# Patient Record
Sex: MALE | Race: WHITE | NOT HISPANIC OR LATINO | Employment: OTHER | URBAN - METROPOLITAN AREA
[De-identification: names, ages, dates, MRNs, and addresses within clinical notes are randomized per-mention and may not be internally consistent; named-entity substitution may affect disease eponyms.]

---

## 2020-03-12 ENCOUNTER — LAB VISIT (OUTPATIENT)
Dept: LAB | Facility: HOSPITAL | Age: 80
End: 2020-03-12
Attending: FAMILY MEDICINE
Payer: MEDICARE

## 2020-03-12 DIAGNOSIS — I48.91 A-FIB: ICD-10-CM

## 2020-03-12 DIAGNOSIS — D40.0 NEOPLASM OF UNCERTAIN BEHAVIOR OF PROSTATE: ICD-10-CM

## 2020-03-12 DIAGNOSIS — F02.80 ALZHEIMER DISEASE: Primary | ICD-10-CM

## 2020-03-12 DIAGNOSIS — I10 HBP (HIGH BLOOD PRESSURE): ICD-10-CM

## 2020-03-12 DIAGNOSIS — G30.9 ALZHEIMER DISEASE: Primary | ICD-10-CM

## 2020-03-12 LAB
ALBUMIN SERPL BCP-MCNC: 3.6 G/DL (ref 3.5–5.2)
ALP SERPL-CCNC: 75 U/L (ref 55–135)
ALT SERPL W/O P-5'-P-CCNC: 30 U/L (ref 10–44)
ANION GAP SERPL CALC-SCNC: 5 MMOL/L (ref 8–16)
AST SERPL-CCNC: 31 U/L (ref 10–40)
BILIRUB SERPL-MCNC: 1 MG/DL (ref 0.1–1)
BUN SERPL-MCNC: 15 MG/DL (ref 8–23)
CALCIUM SERPL-MCNC: 8.8 MG/DL (ref 8.7–10.5)
CHLORIDE SERPL-SCNC: 103 MMOL/L (ref 95–110)
CHOLEST SERPL-MCNC: 195 MG/DL (ref 120–199)
CHOLEST/HDLC SERPL: 3.8 {RATIO} (ref 2–5)
CO2 SERPL-SCNC: 29 MMOL/L (ref 23–29)
COMPLEXED PSA SERPL-MCNC: 0.91 NG/ML (ref 0–4)
CREAT SERPL-MCNC: 0.9 MG/DL (ref 0.5–1.4)
ERYTHROCYTE [DISTWIDTH] IN BLOOD BY AUTOMATED COUNT: 12.8 % (ref 11.5–14.5)
EST. GFR  (AFRICAN AMERICAN): >60 ML/MIN/1.73 M^2
EST. GFR  (NON AFRICAN AMERICAN): >60 ML/MIN/1.73 M^2
GLUCOSE SERPL-MCNC: 97 MG/DL (ref 70–110)
HCT VFR BLD AUTO: 44.7 % (ref 40–54)
HDLC SERPL-MCNC: 51 MG/DL (ref 40–75)
HDLC SERPL: 26.2 % (ref 20–50)
HGB BLD-MCNC: 15 G/DL (ref 14–18)
LDLC SERPL CALC-MCNC: 128.2 MG/DL (ref 63–159)
MCH RBC QN AUTO: 34.4 PG (ref 27–31)
MCHC RBC AUTO-ENTMCNC: 33.6 G/DL (ref 32–36)
MCV RBC AUTO: 103 FL (ref 82–98)
NONHDLC SERPL-MCNC: 144 MG/DL
PLATELET # BLD AUTO: 259 K/UL (ref 150–350)
PMV BLD AUTO: 11.4 FL (ref 9.2–12.9)
POTASSIUM SERPL-SCNC: 4.2 MMOL/L (ref 3.5–5.1)
PROT SERPL-MCNC: 6.5 G/DL (ref 6–8.4)
RBC # BLD AUTO: 4.36 M/UL (ref 4.6–6.2)
SODIUM SERPL-SCNC: 137 MMOL/L (ref 136–145)
TRIGL SERPL-MCNC: 79 MG/DL (ref 30–150)
TSH SERPL DL<=0.005 MIU/L-ACNC: 1.04 UIU/ML (ref 0.34–5.6)
WBC # BLD AUTO: 8.89 K/UL (ref 3.9–12.7)

## 2020-03-12 PROCEDURE — 36415 COLL VENOUS BLD VENIPUNCTURE: CPT

## 2020-03-12 PROCEDURE — 84443 ASSAY THYROID STIM HORMONE: CPT

## 2020-03-12 PROCEDURE — 85027 COMPLETE CBC AUTOMATED: CPT

## 2020-03-12 PROCEDURE — 84153 ASSAY OF PSA TOTAL: CPT

## 2020-03-12 PROCEDURE — 80061 LIPID PANEL: CPT

## 2020-03-12 PROCEDURE — 80053 COMPREHEN METABOLIC PANEL: CPT

## 2020-08-28 ENCOUNTER — HOSPITAL ENCOUNTER (OUTPATIENT)
Facility: HOSPITAL | Age: 80
Discharge: HOSPICE | End: 2020-08-31
Attending: EMERGENCY MEDICINE | Admitting: FAMILY MEDICINE
Payer: MEDICARE

## 2020-08-28 DIAGNOSIS — R05.9 COUGH: ICD-10-CM

## 2020-08-28 DIAGNOSIS — F02.80 LATE ONSET ALZHEIMER'S DISEASE WITHOUT BEHAVIORAL DISTURBANCE: ICD-10-CM

## 2020-08-28 DIAGNOSIS — G30.1 LATE ONSET ALZHEIMER'S DISEASE WITHOUT BEHAVIORAL DISTURBANCE: ICD-10-CM

## 2020-08-28 DIAGNOSIS — I48.91 ATRIAL FIBRILLATION, UNSPECIFIED TYPE: ICD-10-CM

## 2020-08-28 DIAGNOSIS — J18.9 COMMUNITY ACQUIRED PNEUMONIA OF RIGHT LUNG, UNSPECIFIED PART OF LUNG: Primary | ICD-10-CM

## 2020-08-28 DIAGNOSIS — R09.02 HYPOXIA: ICD-10-CM

## 2020-08-28 LAB
ALBUMIN SERPL BCP-MCNC: 3.7 G/DL (ref 3.5–5.2)
ALLENS TEST: ABNORMAL
ALP SERPL-CCNC: 85 U/L (ref 55–135)
ALT SERPL W/O P-5'-P-CCNC: 38 U/L (ref 10–44)
ANION GAP SERPL CALC-SCNC: 9 MMOL/L (ref 8–16)
AST SERPL-CCNC: 34 U/L (ref 10–40)
BASOPHILS NFR BLD: 0 % (ref 0–1.9)
BILIRUB SERPL-MCNC: 1 MG/DL (ref 0.1–1)
BILIRUB UR QL STRIP: NEGATIVE
BNP SERPL-MCNC: 121 PG/ML (ref 0–99)
BUN SERPL-MCNC: 14 MG/DL (ref 8–23)
CALCIUM SERPL-MCNC: 8.8 MG/DL (ref 8.7–10.5)
CHLORIDE SERPL-SCNC: 100 MMOL/L (ref 95–110)
CLARITY UR: CLEAR
CO2 SERPL-SCNC: 26 MMOL/L (ref 23–29)
COLOR UR: YELLOW
CREAT SERPL-MCNC: 0.9 MG/DL (ref 0.5–1.4)
D DIMER PPP IA.FEU-MCNC: <0.19 MG/L FEU
DELSYS: ABNORMAL
DIFFERENTIAL METHOD: ABNORMAL
EOSINOPHIL NFR BLD: 22 % (ref 0–8)
ERYTHROCYTE [DISTWIDTH] IN BLOOD BY AUTOMATED COUNT: 13.1 % (ref 11.5–14.5)
EST. GFR  (AFRICAN AMERICAN): >60 ML/MIN/1.73 M^2
EST. GFR  (NON AFRICAN AMERICAN): >60 ML/MIN/1.73 M^2
FIO2: 21
GLUCOSE SERPL-MCNC: 74 MG/DL (ref 70–110)
GLUCOSE UR QL STRIP: NEGATIVE
HCO3 UR-SCNC: 23.4 MMOL/L (ref 24–28)
HCT VFR BLD AUTO: 42.5 % (ref 40–54)
HGB BLD-MCNC: 14.5 G/DL (ref 14–18)
HGB UR QL STRIP: NEGATIVE
IMM GRANULOCYTES # BLD AUTO: ABNORMAL K/UL (ref 0–0.04)
IMM GRANULOCYTES NFR BLD AUTO: ABNORMAL % (ref 0–0.5)
KETONES UR QL STRIP: NEGATIVE
LACTATE SERPL-SCNC: 1.2 MMOL/L (ref 0.5–2.2)
LEUKOCYTE ESTERASE UR QL STRIP: NEGATIVE
LYMPHOCYTES NFR BLD: 57 % (ref 18–48)
MCH RBC QN AUTO: 33.5 PG (ref 27–31)
MCHC RBC AUTO-ENTMCNC: 34.1 G/DL (ref 32–36)
MCV RBC AUTO: 98 FL (ref 82–98)
MODE: ABNORMAL
MONOCYTES NFR BLD: 3 % (ref 4–15)
NEUTROPHILS NFR BLD: 18 % (ref 38–73)
NITRITE UR QL STRIP: NEGATIVE
NRBC BLD-RTO: 0 /100 WBC
PCO2 BLDA: 36.2 MMHG (ref 35–45)
PH SMN: 7.42 [PH] (ref 7.35–7.45)
PH UR STRIP: 7 [PH] (ref 5–8)
PLATELET # BLD AUTO: 198 K/UL (ref 150–350)
PLATELET BLD QL SMEAR: ABNORMAL
PMV BLD AUTO: 11 FL (ref 9.2–12.9)
PO2 BLDA: 62 MMHG (ref 80–100)
POC BE: -1 MMOL/L
POC SATURATED O2: 92 % (ref 95–100)
POC TCO2: 25 MMOL/L (ref 23–27)
POTASSIUM SERPL-SCNC: 4.3 MMOL/L (ref 3.5–5.1)
PROT SERPL-MCNC: 6.6 G/DL (ref 6–8.4)
PROT UR QL STRIP: NEGATIVE
RBC # BLD AUTO: 4.33 M/UL (ref 4.6–6.2)
SAMPLE: ABNORMAL
SARS-COV-2 RDRP RESP QL NAA+PROBE: NEGATIVE
SITE: ABNORMAL
SODIUM SERPL-SCNC: 135 MMOL/L (ref 136–145)
SP GR UR STRIP: 1.01 (ref 1–1.03)
TROPONIN I SERPL DL<=0.01 NG/ML-MCNC: 0.01 NG/ML (ref 0.02–0.5)
URN SPEC COLLECT METH UR: NORMAL
UROBILINOGEN UR STRIP-ACNC: NEGATIVE EU/DL
WBC # BLD AUTO: 16.79 K/UL (ref 3.9–12.7)

## 2020-08-28 PROCEDURE — 84484 ASSAY OF TROPONIN QUANT: CPT

## 2020-08-28 PROCEDURE — 99219 PR INITIAL OBSERVATION CARE,LEVL II: ICD-10-PCS | Mod: ,,, | Performed by: FAMILY MEDICINE

## 2020-08-28 PROCEDURE — 85007 BL SMEAR W/DIFF WBC COUNT: CPT

## 2020-08-28 PROCEDURE — 96365 THER/PROPH/DIAG IV INF INIT: CPT

## 2020-08-28 PROCEDURE — 25000003 PHARM REV CODE 250: Performed by: FAMILY MEDICINE

## 2020-08-28 PROCEDURE — 99291 CRITICAL CARE FIRST HOUR: CPT | Mod: 25

## 2020-08-28 PROCEDURE — 99219 PR INITIAL OBSERVATION CARE,LEVL II: CPT | Mod: ,,, | Performed by: FAMILY MEDICINE

## 2020-08-28 PROCEDURE — G0378 HOSPITAL OBSERVATION PER HR: HCPCS

## 2020-08-28 PROCEDURE — 82803 BLOOD GASES ANY COMBINATION: CPT

## 2020-08-28 PROCEDURE — 94760 N-INVAS EAR/PLS OXIMETRY 1: CPT

## 2020-08-28 PROCEDURE — 85379 FIBRIN DEGRADATION QUANT: CPT

## 2020-08-28 PROCEDURE — 83880 ASSAY OF NATRIURETIC PEPTIDE: CPT

## 2020-08-28 PROCEDURE — 80053 COMPREHEN METABOLIC PANEL: CPT

## 2020-08-28 PROCEDURE — 71045 X-RAY EXAM CHEST 1 VIEW: CPT | Mod: 26,,, | Performed by: RADIOLOGY

## 2020-08-28 PROCEDURE — 71045 XR CHEST AP PORTABLE: ICD-10-PCS | Mod: 26,,, | Performed by: RADIOLOGY

## 2020-08-28 PROCEDURE — 81003 URINALYSIS AUTO W/O SCOPE: CPT

## 2020-08-28 PROCEDURE — 36415 COLL VENOUS BLD VENIPUNCTURE: CPT

## 2020-08-28 PROCEDURE — 94761 N-INVAS EAR/PLS OXIMETRY MLT: CPT

## 2020-08-28 PROCEDURE — 99900035 HC TECH TIME PER 15 MIN (STAT)

## 2020-08-28 PROCEDURE — 63600175 PHARM REV CODE 636 W HCPCS: Performed by: NURSE PRACTITIONER

## 2020-08-28 PROCEDURE — 87040 BLOOD CULTURE FOR BACTERIA: CPT | Mod: 59

## 2020-08-28 PROCEDURE — 25000242 PHARM REV CODE 250 ALT 637 W/ HCPCS: Performed by: FAMILY MEDICINE

## 2020-08-28 PROCEDURE — 27000221 HC OXYGEN, UP TO 24 HOURS

## 2020-08-28 PROCEDURE — 93005 ELECTROCARDIOGRAM TRACING: CPT

## 2020-08-28 PROCEDURE — 94640 AIRWAY INHALATION TREATMENT: CPT

## 2020-08-28 PROCEDURE — U0002 COVID-19 LAB TEST NON-CDC: HCPCS

## 2020-08-28 PROCEDURE — 21400001 HC TELEMETRY ROOM

## 2020-08-28 PROCEDURE — 71045 X-RAY EXAM CHEST 1 VIEW: CPT | Mod: TC,FY

## 2020-08-28 PROCEDURE — 63700000 PHARM REV CODE 250 ALT 637 W/O HCPCS: Performed by: NURSE PRACTITIONER

## 2020-08-28 PROCEDURE — 83605 ASSAY OF LACTIC ACID: CPT

## 2020-08-28 PROCEDURE — 36600 WITHDRAWAL OF ARTERIAL BLOOD: CPT

## 2020-08-28 PROCEDURE — 85027 COMPLETE CBC AUTOMATED: CPT

## 2020-08-28 RX ORDER — METOPROLOL SUCCINATE 25 MG/1
25 TABLET, EXTENDED RELEASE ORAL DAILY
Status: DISCONTINUED | OUTPATIENT
Start: 2020-08-29 | End: 2020-08-31 | Stop reason: HOSPADM

## 2020-08-28 RX ORDER — METOPROLOL SUCCINATE 25 MG/1
25 TABLET, EXTENDED RELEASE ORAL DAILY
COMMUNITY

## 2020-08-28 RX ORDER — HYDROXYZINE HYDROCHLORIDE 50 MG/1
50 TABLET, FILM COATED ORAL 3 TIMES DAILY PRN
COMMUNITY

## 2020-08-28 RX ORDER — ROSUVASTATIN CALCIUM 10 MG/1
10 TABLET, COATED ORAL DAILY
COMMUNITY

## 2020-08-28 RX ORDER — IPRATROPIUM BROMIDE AND ALBUTEROL SULFATE 2.5; .5 MG/3ML; MG/3ML
SOLUTION RESPIRATORY (INHALATION)
Status: DISPENSED
Start: 2020-08-28 | End: 2020-08-29

## 2020-08-28 RX ORDER — ALPRAZOLAM 0.25 MG/1
0.25 TABLET ORAL 2 TIMES DAILY
Status: DISCONTINUED | OUTPATIENT
Start: 2020-08-28 | End: 2020-08-31 | Stop reason: HOSPADM

## 2020-08-28 RX ORDER — ATORVASTATIN CALCIUM 10 MG/1
10 TABLET, FILM COATED ORAL NIGHTLY
Status: DISCONTINUED | OUTPATIENT
Start: 2020-08-28 | End: 2020-08-31 | Stop reason: HOSPADM

## 2020-08-28 RX ORDER — ALPRAZOLAM 0.25 MG/1
TABLET ORAL 3 TIMES DAILY
COMMUNITY

## 2020-08-28 RX ORDER — AZITHROMYCIN 250 MG/1
500 TABLET, FILM COATED ORAL
Status: COMPLETED | OUTPATIENT
Start: 2020-08-28 | End: 2020-08-28

## 2020-08-28 RX ORDER — RIVAROXABAN 20 MG/1
20 TABLET, FILM COATED ORAL
COMMUNITY

## 2020-08-28 RX ORDER — SODIUM CHLORIDE 0.9 % (FLUSH) 0.9 %
10 SYRINGE (ML) INJECTION
Status: DISCONTINUED | OUTPATIENT
Start: 2020-08-28 | End: 2020-08-31 | Stop reason: HOSPADM

## 2020-08-28 RX ORDER — LORATADINE 10 MG/1
10 TABLET ORAL DAILY
COMMUNITY

## 2020-08-28 RX ORDER — MEMANTINE HYDROCHLORIDE 5 MG/1
5 TABLET ORAL 2 TIMES DAILY
Status: DISCONTINUED | OUTPATIENT
Start: 2020-08-28 | End: 2020-08-31 | Stop reason: HOSPADM

## 2020-08-28 RX ORDER — HYDROXYZINE HYDROCHLORIDE 25 MG/1
50 TABLET, FILM COATED ORAL 3 TIMES DAILY PRN
Status: DISCONTINUED | OUTPATIENT
Start: 2020-08-28 | End: 2020-08-31 | Stop reason: HOSPADM

## 2020-08-28 RX ORDER — IPRATROPIUM BROMIDE AND ALBUTEROL SULFATE 2.5; .5 MG/3ML; MG/3ML
3 SOLUTION RESPIRATORY (INHALATION) EVERY 4 HOURS PRN
Status: DISCONTINUED | OUTPATIENT
Start: 2020-08-28 | End: 2020-08-31 | Stop reason: HOSPADM

## 2020-08-28 RX ORDER — SODIUM CHLORIDE 9 MG/ML
INJECTION, SOLUTION INTRAVENOUS CONTINUOUS
Status: DISCONTINUED | OUTPATIENT
Start: 2020-08-28 | End: 2020-08-28

## 2020-08-28 RX ORDER — MEMANTINE HYDROCHLORIDE 10 MG/1
5 TABLET ORAL 2 TIMES DAILY
COMMUNITY

## 2020-08-28 RX ORDER — CETIRIZINE HYDROCHLORIDE 10 MG/1
10 TABLET ORAL DAILY
Status: DISCONTINUED | OUTPATIENT
Start: 2020-08-29 | End: 2020-08-31 | Stop reason: HOSPADM

## 2020-08-28 RX ADMIN — IPRATROPIUM BROMIDE AND ALBUTEROL SULFATE 3 ML: .5; 3 SOLUTION RESPIRATORY (INHALATION) at 11:08

## 2020-08-28 RX ADMIN — MEMANTINE HYDROCHLORIDE 5 MG: 5 TABLET ORAL at 08:08

## 2020-08-28 RX ADMIN — ALPRAZOLAM 0.25 MG: 0.25 TABLET ORAL at 08:08

## 2020-08-28 RX ADMIN — CEFTRIAXONE 1 G: 1 INJECTION, SOLUTION INTRAVENOUS at 04:08

## 2020-08-28 RX ADMIN — AZITHROMYCIN MONOHYDRATE 500 MG: 250 TABLET ORAL at 04:08

## 2020-08-28 RX ADMIN — ATORVASTATIN CALCIUM 10 MG: 10 TABLET, FILM COATED ORAL at 08:08

## 2020-08-28 RX ADMIN — IPRATROPIUM BROMIDE AND ALBUTEROL SULFATE 3 ML: .5; 3 SOLUTION RESPIRATORY (INHALATION) at 07:08

## 2020-08-28 RX ADMIN — RIVAROXABAN 20 MG: 10 TABLET, FILM COATED ORAL at 08:08

## 2020-08-28 NOTE — SUBJECTIVE & OBJECTIVE
Past Medical History:   Diagnosis Date    Alzheimer disease     Hyperlipemia        History reviewed. No pertinent surgical history.    Review of patient's allergies indicates:  No Known Allergies    No current facility-administered medications on file prior to encounter.      Current Outpatient Medications on File Prior to Encounter   Medication Sig    ALPRAZolam (XANAX) 0.25 MG tablet Take by mouth 3 (three) times daily.    hydrOXYzine (ATARAX) 50 MG tablet Take 50 mg by mouth 3 (three) times daily as needed for Itching.    loratadine (CLARITIN) 10 mg tablet Take 10 mg by mouth once daily.    memantine (NAMENDA) 10 MG Tab Take 5 mg by mouth 2 (two) times daily.    metoprolol succinate (TOPROL-XL) 25 MG 24 hr tablet Take 25 mg by mouth once daily.    rivaroxaban (XARELTO) 20 mg Tab Take 20 mg by mouth daily with dinner or evening meal.    rosuvastatin (CRESTOR) 10 MG tablet Take 10 mg by mouth once daily.     Family History     None        Tobacco Use    Smoking status: Never Smoker   Substance and Sexual Activity    Alcohol use: Never     Frequency: Never    Drug use: Not Currently    Sexual activity: Not Currently     Review of Systems   Unable to perform ROS: Dementia   Constitutional: Negative for fatigue and fever.   HENT: Positive for congestion.    Respiratory: Positive for cough, shortness of breath and wheezing.    Cardiovascular: Positive for leg swelling. Negative for chest pain and palpitations.   Gastrointestinal: Negative for abdominal pain.   Genitourinary: Negative.    Musculoskeletal: Negative for arthralgias.   Neurological:        Dementia   Psychiatric/Behavioral: Positive for confusion.        Worsening confusion     Objective:     Vital Signs (Most Recent):  Temp: 98.5 °F (36.9 °C) (08/28/20 1247)  Pulse: 68 (08/28/20 1632)  Resp: 18 (08/28/20 1632)  BP: (!) 155/80 (08/28/20 1632)  SpO2: 96 % (08/28/20 1632) Vital Signs (24h Range):  Temp:  [98.5 °F (36.9 °C)] 98.5 °F (36.9  °C)  Pulse:  [68-74] 68  Resp:  [18-21] 18  SpO2:  [93 %-96 %] 96 %  BP: (132-155)/(69-80) 155/80     Weight: 83.9 kg (185 lb)  Body mass index is 23.75 kg/m².    Physical Exam  Vitals signs reviewed.   Constitutional:       Appearance: Normal appearance. He is not ill-appearing or toxic-appearing.   HENT:      Head: Normocephalic and atraumatic.      Right Ear: External ear normal.      Left Ear: External ear normal.      Nose: Nose normal.      Mouth/Throat:      Mouth: Mucous membranes are dry.   Eyes:      Conjunctiva/sclera: Conjunctivae normal.   Cardiovascular:      Rate and Rhythm: Normal rate. Rhythm irregular.      Pulses: Normal pulses.      Heart sounds: No murmur. No gallop.    Pulmonary:      Effort: Pulmonary effort is normal.      Comments: No increased work of breathing or tachypnea, scattered end expiratory wheezes, bibasilar crackles  Abdominal:      General: Abdomen is flat. Bowel sounds are normal. There is no distension.      Tenderness: There is no abdominal tenderness.   Musculoskeletal:      Comments: Mild 1+ pitting edema b/l lower extremities   Skin:     General: Skin is warm and dry.   Neurological:      Mental Status: He is alert.      Comments: Pleasantly confused, worsening confusion than baseline             Significant Labs:   Recent Lab Results       08/28/20  1618   08/28/20  1542   08/28/20  1521   08/28/20  1406   08/28/20  1259        Albumin       3.7       Alkaline Phosphatase       85       Allens Test Pass             ALT       38       Anion Gap       9       Appearance, UA     Clear         AST       34       Basophil%       0.0       Bilirubin (UA)     Negative         BILIRUBIN TOTAL       1.0  Comment:  For infants and newborns, interpretation of results should be based  on gestational age, weight and in agreement with clinical  observations.  Premature Infant recommended reference ranges:  Up to 24 hours.............<8.0 mg/dL  Up to 48 hours............<12.0  mg/dL  3-5 days..................<15.0 mg/dL  6-29 days.................<15.0 mg/dL         BNP       121  Comment:  Values of less than 100 pg/ml are consistent with non-CHF populations.       Site LR             BUN, Bld       14       Calcium       8.8       Chloride       100       CO2       26       Color, UA     Yellow         Creatinine       0.9       D-Dimer   <0.19  Comment:  The quantitative D-dimer assay should be used as an aid in   the diagnosis of deep vein thrombosis and pulmonary embolism  in patients with the appropriate presentation and clinical  history. The upper limit of the reference interval and the clinical   cut off   point are identical. Causes of a positive (>0.50 mg/L FEU) D-Dimer   test  include, but are not limited to: DVT, PE, DIC, thrombolytic   therapy, anticoagulant therapy, recent surgery, trauma, or   pregnancy, disseminated malignancy, aortic aneurysm, cirrhosis,  and severe infection. False negative results may occur in   patients with distal DVT.             [facility] Room [facility]             Differential Method       Manual  Comment:  Corrected result; previously reported as Automated on 08/28/2020 at   15:20.  [C]       eGFR if        >60.0       eGFR if non        >60.0  Comment:  Calculation used to obtain the estimated glomerular filtration  rate (eGFR) is the CKD-EPI equation.          Eosinophil%       22.0       FiO2 21             Glucose       74       Glucose, UA     Negative         Gran%       18.0       Hematocrit       42.5       Hemoglobin       14.5       Immature Grans (Abs)       CANCELED  Comment:  Mild elevation in immature granulocytes is non specific and   can be seen in a variety of conditions including stress response,   acute inflammation, trauma and pregnancy. Correlation with other   laboratory and clinical findings is essential.    Result canceled by the ancillary.         Immature Granulocytes        CANCELED  Comment:  Result canceled by the ancillary.       Ketones, UA     Negative         Leukocytes, UA     Negative         Lymph%       57.0       MCH       33.5       MCHC       34.1       MCV       98       Mode SPONT             Mono%       3.0       MPV       11.0       NITRITE UA     Negative         nRBC       0       Occult Blood UA     Negative         pH, UA     7.0         Platelet Estimate       Appears normal       Platelets       198       POC BE -1             POC HCO3 23.4             POC PCO2 36.2             POC PH 7.419             POC PO2 62             POC SATURATED O2 92             POC TCO2 25             Potassium       4.3       PROTEIN TOTAL       6.6       Protein, UA     Negative  Comment:  Recommend a 24 hour urine protein or a urine   protein/creatinine ratio if globulin induced proteinuria is  clinically suspected.           RBC       4.33       RDW       13.1       Sample ARTERIAL             SARS-CoV-2 RNA, Amplification, Qual         Negative  Comment:  This test utilizes isothermal nucleic acid amplification   technology to detect the SARS-CoV-2 RdRp nucleic acid segment.   The analytical sensitivity (limit of detection) is 125 genome   equivalents/mL.   A POSITIVE result implies infection with the SARS-CoV-2 virus;  the patient is presumed to be contagious.    A NEGATIVE result means that SARS-CoV-2 nucleic acids are not  present above the limit of detection. A NEGATIVE result should be   treated as presumptive. It does not rule out the possibility of   COVID-19 and should not be the sole basis for treatment decisions.   If COVID-19 is strongly suspected based on clinical and exposure   history, re-testing using an alternate molecular assay should be   considered.   This test is only for use under the Food and Drug   Administration s Emergency Use Authorization (EUA).   Commercial kits are provided by [employer].   Performance characteristics of the EUA have been  independently  verified by [facility] Department of  Pathology and Laboratory Medicine.   _________________________________________________________________  The ID NOW COVID-19 Letter of Authorization, along with the   authorized Fact Sheet for Healthcare Providers, the authorized Fact  Sheet for Patients, and authorized labeling are available on the FDA   website:  www.fda.gov/MedicalDevices/Safety/EmergencySituations/hkj247331.htm       Sodium       135       Specific [place], UA     1.015         Specimen UA     Urine, Clean Catch         Troponin I       0.01       UROBILINOGEN UA     Negative         WBC       16.79                          All pertinent labs within the past 24 hours have been reviewed.    Significant Imaging: I have reviewed all pertinent imaging results/findings within the past 24 hours.

## 2020-08-28 NOTE — HPI
81 yo male with PMH of Alzheimer disease, A-fib on Xarelto, hyperlipidemia who presents to the ER with shortness of breath, cough. Patient was told to come to the ER for COVID testing but on arrival was found to be hypoxic with O2 sats around 90%. Blood work showed WBC 16,000. Wife reports that he has been sitting up on the side of the bed in the middle of the night trying to catch his breath. Has a worsening cough. Mild leg swelling. Wife and daughter report worsening confusion over the last few days. CXR essentially normal though given hypoxia on O2 sats and ABG with Pa02 62, hospital team called for admission for possible developing pneumonia.

## 2020-08-28 NOTE — ED PROVIDER NOTES
Encounter Date: 8/28/2020       History     Chief Complaint   Patient presents with    COVID-19 Concerns     80-year-old male with wife and daughter presents to ER by patient's PCP Dr. Cohn for COVID-19 screening; patient has been having mild shortness of breath & cough for several months, Dr. Cohn is declining to see patient until he is cleared from COVID-19, patient's shortness of breath worsens with activity, rest mildly improved his symptoms, symptoms have been waxing/waning since onset    Denies fever, headache, dizziness, syncope, vision changes, neck pain, painful/difficult swallowing, chest pain, abdominal pain, nausea/vomiting/diarrhea, hematuria/dysuria    Past medical/surgical history, allergies & current medications reviewed with patient -- Alzheimer's    Known SARS-CoV2 exposure:  No  Smokes:  No -- only as a youth      The history is provided by the patient. No  was used.     Review of patient's allergies indicates:  No Known Allergies  Past Medical History:   Diagnosis Date    Alzheimer disease     Hyperlipemia      History reviewed. No pertinent surgical history.  History reviewed. No pertinent family history.  Social History     Tobacco Use    Smoking status: Never Smoker   Substance Use Topics    Alcohol use: Never     Frequency: Never    Drug use: Not Currently     Review of Systems   Constitutional: Positive for fatigue. Negative for fever.   HENT: Negative for sore throat.    Eyes: Negative for visual disturbance.   Respiratory: Positive for cough and shortness of breath.    Cardiovascular: Negative for chest pain and leg swelling.   Gastrointestinal: Negative for abdominal pain and nausea.   Genitourinary: Negative for dysuria.   Musculoskeletal: Negative for back pain.   Skin: Negative for rash.   Neurological: Negative for weakness and headaches.   Hematological: Does not bruise/bleed easily.   All other systems reviewed and are negative.      Physical Exam      Initial Vitals [08/28/20 1247]   BP Pulse Resp Temp SpO2   132/69 71 18 98.5 °F (36.9 °C) (!) 93 %      MAP       --         Physical Exam    Nursing note and vitals reviewed.  Constitutional: He appears well-developed. He does not appear ill. No distress.   AF, SAT 93% RA   HENT:   Head: Normocephalic and atraumatic.   Right Ear: External ear normal.   Left Ear: External ear normal.   Nose: Nose normal.   Eyes: Lids are normal.   Neck: Neck supple.   Cardiovascular: Normal rate.   Pulses:       Dorsalis pedis pulses are 2+ on the right side and 2+ on the left side.        Posterior tibial pulses are 2+ on the right side and 2+ on the left side.   Pulmonary/Chest: Effort normal and breath sounds normal. No respiratory distress.   Abdominal: Soft. Bowel sounds are normal. He exhibits no distension. There is no abdominal tenderness.   Musculoskeletal:        Hands:       Right lower leg: He exhibits no swelling. No edema.      Left lower leg: He exhibits no swelling. No edema.   Neurological: He is alert.   Skin: Skin is warm. Capillary refill takes less than 2 seconds. No rash noted.   Psychiatric: He has a normal mood and affect.   Patient is pleasant and cooperative         ED Course   Critical Care    Date/Time: 8/28/2020 4:23 PM  Performed by: Jam Green NP  Authorized by: Nick Nagy MD   Total critical care time (exclusive of procedural time) : 30 minutes        Labs Reviewed   B-TYPE NATRIURETIC PEPTIDE - Abnormal; Notable for the following components:       Result Value     (*)     All other components within normal limits   CBC W/ AUTO DIFFERENTIAL - Abnormal; Notable for the following components:    WBC 16.79 (*)     RBC 4.33 (*)     Mean Corpuscular Hemoglobin 33.5 (*)     Gran% 18.0 (*)     Lymph% 57.0 (*)     Mono% 3.0 (*)     Eosinophil% 22.0 (*)     All other components within normal limits   COMPREHENSIVE METABOLIC PANEL - Abnormal; Notable for the following components:    Sodium  135 (*)     All other components within normal limits   TROPONIN I - Abnormal; Notable for the following components:    Troponin I 0.01 (*)     All other components within normal limits   ISTAT PROCEDURE - Abnormal; Notable for the following components:    POC PO2 62 (*)     POC HCO3 23.4 (*)     POC SATURATED O2 92 (*)     All other components within normal limits   SARS-COV-2 RNA AMPLIFICATION, QUAL   URINALYSIS, REFLEX TO URINE CULTURE    Narrative:     Specimen Source->Urine   D DIMER, QUANTITATIVE   D DIMER, QUANTITATIVE   LACTIC ACID, PLASMA     EKG Readings: (Independently Interpreted)   Initial Reading: No STEMI. Rhythm: Atrial Fibrillation. Heart Rate: 64. Ectopy: No Ectopy. Conduction: Normal. ST Segments: Normal ST Segments. Axis: Normal. Other Impression: Flattened T-wave V2, Abnormal EKG   Time: 1413  No previous EKG available       Imaging Results          X-Ray Chest AP Portable (Final result)  Result time 08/28/20 13:24:50    Final result by Sophy Bird MD (08/28/20 13:24:50)                 Impression:      Chronic perihilar peribronchial thickening which has increased since 2015.  Otherwise normal chest x-ray.      Electronically signed by: Sophy Bird  Date:    08/28/2020  Time:    13:24             Narrative:    EXAMINATION:  XR CHEST AP PORTABLE    CLINICAL HISTORY:  Cough, possible COVID-19    TECHNIQUE:  Single frontal view of the chest was performed.    COMPARISON:  8/18/15    FINDINGS:  The cardiomediastinal silhouette is within normal limits. There is calcific plaque in the aorta.  Perihilar peribronchial thickening is slightly more pronounced compared to the 2015 study.  The lungs are otherwise clear.  There is no pleural effusion or pneumothorax. No acute bony abnormality.                                 Medical Decision Making:   ED Management:  Chest x-ray image/report reviewed:  Chronic perihilar peribronchial thickening which has increased since 2015.  Otherwise normal  chest x-ray.    Lab results reviewed, significant findings:  COVID-19 negative, WBC 16, troponin negative, US is unimpressive -- BCX pending    ABG reviewed    Medications given:  Oxygen, azithromycin, Rocephin    Findings, diagnosis & plan of care discussed with patient/family:  Hypoxia, leukocytosis, CAP; we will admit patient to Hospitalist Service, patient/family agrees with this plan -- all questions answered, pleasant visit    Disclaimer:  This note was prepared with [employer] voice recognition transcription software. Garbled syntax, mangled pronouns, and other bizarre constructions may be attributed to that software system.  Should there be any questions do not hesitate to contact me for clarification.    Other:   I have discussed this case with another health care provider.       <> Summary of the Discussion: Dr. Nagy, Dr. Talbot                   ED Course as of Aug 29 1253   Fri Aug 28, 2020   1557 C/o AMS, nausea & lethargy sleeping all day    I have performed the rapid medical exam (RME) portion of the medical screening exam (MSE) & have determined that this patient requires further evaluation and/or testing to determine if an emergent medical condition exists     [DH]   1611 Spoke with Dr. Talbot who accepts PT for admission    [DH]      ED Course User Index  [DH] Jam Green NP                Clinical Impression:       ICD-10-CM ICD-9-CM   1. Community acquired pneumonia of right lung, unspecified part of lung  J18.9 486   2. Cough  R05 786.2   3. Hypoxia  R09.02 799.02             ED Disposition Condition    Observation                           Jam Green NP  08/28/20 1623       Jam Green NP  08/29/20 1254

## 2020-08-28 NOTE — ASSESSMENT & PLAN NOTE
Possibly pneumonia vs CHF with elevated BNP (slight) vs chronic interstitial lung disease  Worsening confusion per family, paroxsymal nocturnal dyspnea though no LYNNE  Afebrile, monitor fever curve  Leukocytosis on admission, monitor daily CBC  Start on IV Rocephin and Azithromycin  IVFs NS @ 75 mL/hr

## 2020-08-28 NOTE — H&P
[facility] Medicine  History & Physical    Patient Name: Benjamin Manrique  MRN: [id number]  Admission Date: 8/28/2020  Attending Physician: Chelsie Talbot MD   Primary Care Provider: Jacky Cohn MD         Patient information was obtained from patient, spouse/SO, relative(s) and ER records.     Subjective:     Principal Problem:Hypoxia    Chief Complaint:   Chief Complaint   Patient presents with    COVID-19 Concerns        HPI: 81 yo male with PMH of Alzheimer disease, A-fib on Xarelto, hyperlipidemia who presents to the ER with shortness of breath, cough. Patient was told to come to the ER for COVID testing but on arrival was found to be hypoxic with O2 sats around 90%. Blood work showed WBC 16,000. Wife reports that he has been sitting up on the side of the bed in the middle of the night trying to catch his breath. Has a worsening cough. Mild leg swelling. Wife and daughter report worsening confusion over the last few days. CXR essentially normal though given hypoxia on O2 sats and ABG with Pa02 62, hospital team called for admission for possible developing pneumonia.     Past Medical History:   Diagnosis Date    Alzheimer disease     Hyperlipemia        History reviewed. No pertinent surgical history.    Review of patient's allergies indicates:  No Known Allergies    No current facility-administered medications on file prior to encounter.      Current Outpatient Medications on File Prior to Encounter   Medication Sig    ALPRAZolam (XANAX) 0.25 MG tablet Take by mouth 3 (three) times daily.    hydrOXYzine (ATARAX) 50 MG tablet Take 50 mg by mouth 3 (three) times daily as needed for Itching.    loratadine (CLARITIN) 10 mg tablet Take 10 mg by mouth once daily.    memantine (NAMENDA) 10 MG Tab Take 5 mg by mouth 2 (two) times daily.    metoprolol succinate (TOPROL-XL) 25 MG 24 hr tablet Take 25 mg by mouth once daily.    rivaroxaban (XARELTO) 20 mg Tab Take  20 mg by mouth daily with dinner or evening meal.    rosuvastatin (CRESTOR) 10 MG tablet Take 10 mg by mouth once daily.     Family History     None        Tobacco Use    Smoking status: Never Smoker   Substance and Sexual Activity    Alcohol use: Never     Frequency: Never    Drug use: Not Currently    Sexual activity: Not Currently     Review of Systems   Unable to perform ROS: Dementia   Constitutional: Negative for fatigue and fever.   HENT: Positive for congestion.    Respiratory: Positive for cough, shortness of breath and wheezing.    Cardiovascular: Positive for leg swelling. Negative for chest pain and palpitations.   Gastrointestinal: Negative for abdominal pain.   Genitourinary: Negative.    Musculoskeletal: Negative for arthralgias.   Neurological:        Dementia   Psychiatric/Behavioral: Positive for confusion.        Worsening confusion     Objective:     Vital Signs (Most Recent):  Temp: 98.5 °F (36.9 °C) (08/28/20 1247)  Pulse: 68 (08/28/20 1632)  Resp: 18 (08/28/20 1632)  BP: (!) 155/80 (08/28/20 1632)  SpO2: 96 % (08/28/20 1632) Vital Signs (24h Range):  Temp:  [98.5 °F (36.9 °C)] 98.5 °F (36.9 °C)  Pulse:  [68-74] 68  Resp:  [18-21] 18  SpO2:  [93 %-96 %] 96 %  BP: (132-155)/(69-80) 155/80     Weight: 83.9 kg (185 lb)  Body mass index is 23.75 kg/m².    Physical Exam  Vitals signs reviewed.   Constitutional:       Appearance: Normal appearance. He is not ill-appearing or toxic-appearing.   HENT:      Head: Normocephalic and atraumatic.      Right Ear: External ear normal.      Left Ear: External ear normal.      Nose: Nose normal.      Mouth/Throat:      Mouth: Mucous membranes are dry.   Eyes:      Conjunctiva/sclera: Conjunctivae normal.   Cardiovascular:      Rate and Rhythm: Normal rate. Rhythm irregular.      Pulses: Normal pulses.      Heart sounds: No murmur. No gallop.    Pulmonary:      Effort: Pulmonary effort is normal.      Comments: No increased work of breathing or tachypnea,  scattered end expiratory wheezes, bibasilar crackles  Abdominal:      General: Abdomen is flat. Bowel sounds are normal. There is no distension.      Tenderness: There is no abdominal tenderness.   Musculoskeletal:      Comments: Mild 1+ pitting edema b/l lower extremities   Skin:     General: Skin is warm and dry.   Neurological:      Mental Status: He is alert.      Comments: Pleasantly confused, worsening confusion than baseline             Significant Labs:   Recent Lab Results       08/28/20  1618   08/28/20  1542   08/28/20  1521   08/28/20  1406   08/28/20  1259        Albumin       3.7       Alkaline Phosphatase       85       Allens Test Pass             ALT       38       Anion Gap       9       Appearance, UA     Clear         AST       34       Basophil%       0.0       Bilirubin (UA)     Negative         BILIRUBIN TOTAL       1.0  Comment:  For infants and newborns, interpretation of results should be based  on gestational age, weight and in agreement with clinical  observations.  Premature Infant recommended reference ranges:  Up to 24 hours.............<8.0 mg/dL  Up to 48 hours............<12.0 mg/dL  3-5 days..................<15.0 mg/dL  6-29 days.................<15.0 mg/dL         BNP       121  Comment:  Values of less than 100 pg/ml are consistent with non-CHF populations.       Site LR             BUN, Bld       14       Calcium       8.8       Chloride       100       CO2       26       Color, UA     Yellow         Creatinine       0.9       D-Dimer   <0.19  Comment:  The quantitative D-dimer assay should be used as an aid in   the diagnosis of deep vein thrombosis and pulmonary embolism  in patients with the appropriate presentation and clinical  history. The upper limit of the reference interval and the clinical   cut off   point are identical. Causes of a positive (>0.50 mg/L FEU) D-Dimer   test  include, but are not limited to: DVT, PE, DIC, thrombolytic   therapy, anticoagulant therapy,  recent surgery, trauma, or   pregnancy, disseminated malignancy, aortic aneurysm, cirrhosis,  and severe infection. False negative results may occur in   patients with distal DVT.             Urlist Room Air             Differential Method       Manual  Comment:  Corrected result; previously reported as Automated on 08/28/2020 at   15:20.  [C]       eGFR if        >60.0       eGFR if non        >60.0  Comment:  Calculation used to obtain the estimated glomerular filtration  rate (eGFR) is the CKD-EPI equation.          Eosinophil%       22.0       FiO2 21             Glucose       74       Glucose, UA     Negative         Gran%       18.0       Hematocrit       42.5       Hemoglobin       14.5       Immature Grans (Abs)       CANCELED  Comment:  Mild elevation in immature granulocytes is non specific and   can be seen in a variety of conditions including stress response,   acute inflammation, trauma and pregnancy. Correlation with other   laboratory and clinical findings is essential.    Result canceled by the ancillary.         Immature Granulocytes       CANCELED  Comment:  Result canceled by the ancillary.       Ketones, UA     Negative         Leukocytes, UA     Negative         Lymph%       57.0       MCH       33.5       MCHC       34.1       MCV       98       Mode SPONT             Mono%       3.0       MPV       11.0       NITRITE UA     Negative         nRBC       0       Occult Blood UA     Negative         pH, UA     7.0         Platelet Estimate       Appears normal       Platelets       198       POC BE -1             POC HCO3 23.4             POC PCO2 36.2             POC PH 7.419             POC PO2 62             POC SATURATED O2 92             POC TCO2 25             Potassium       4.3       PROTEIN TOTAL       6.6       Protein, UA     Negative  Comment:  Recommend a 24 hour urine protein or a urine   protein/creatinine ratio if globulin induced proteinuria  is  clinically suspected.           RBC       4.33       RDW       13.1       Sample ARTERIAL             SARS-CoV-2 RNA, Amplification, Qual         Negative  Comment:  This test utilizes isothermal nucleic acid amplification   technology to detect the SARS-CoV-2 RdRp nucleic acid segment.   The analytical sensitivity (limit of detection) is 125 genome   equivalents/mL.   A POSITIVE result implies infection with the SARS-CoV-2 virus;  the patient is presumed to be contagious.    A NEGATIVE result means that SARS-CoV-2 nucleic acids are not  present above the limit of detection. A NEGATIVE result should be   treated as presumptive. It does not rule out the possibility of   COVID-19 and should not be the sole basis for treatment decisions.   If COVID-19 is strongly suspected based on clinical and exposure   history, re-testing using an alternate molecular assay should be   considered.   This test is only for use under the Food and Drug   Administration s Emergency Use Authorization (EUA).   Commercial kits are provided by [employer].   Performance characteristics of the EUA have been independently  verified by [facility] Department of  Pathology and Laboratory Medicine.   _________________________________________________________________  The ID NOW COVID-19 Letter of Authorization, along with the   authorized Fact Sheet for Healthcare Providers, the authorized Fact  Sheet for Patients, and authorized labeling are available on the FDA   website:  www.fda.gov/MedicalDevices/Safety/EmergencySituations/xyt260176.htm       Sodium       135       Specific [place], UA     1.015         Specimen UA     Urine, Clean Catch         Troponin I       0.01       UROBILINOGEN UA     Negative         WBC       16.79                          All pertinent labs within the past 24 hours have been reviewed.    Significant Imaging: I have reviewed all pertinent imaging results/findings within the past 24  hours.    Assessment/Plan:     * Hypoxia  Possibly pneumonia vs CHF with elevated BNP (slight) vs chronic interstitial lung disease  Worsening confusion per family, paroxsymal nocturnal dyspnea though no LYNNE  Afebrile, monitor fever curve  Leukocytosis on admission, monitor daily CBC  Start on IV Rocephin and Azithromycin  IVFs NS @ 75 mL/hr        Atrial fibrillation  Rate controlled  Continue home Xarelto and metoprolol  Monitor on telemetry      Late onset Alzheimer's disease without behavioral disturbance  Continue home medications  Fall precautions  One to one sitter        VTE Risk Mitigation (From admission, onward)         Ordered     IP VTE HIGH RISK PATIENT  Once      08/28/20 1756     Place sequential compression device  Until discontinued      08/28/20 1756                   Chelsie Talbot MD  Department of Hospital Medicine   [facility]

## 2020-08-29 LAB
ANION GAP SERPL CALC-SCNC: 10 MMOL/L (ref 8–16)
BASOPHILS NFR BLD: 0 % (ref 0–1.9)
BUN SERPL-MCNC: 13 MG/DL (ref 8–23)
CALCIUM SERPL-MCNC: 8.6 MG/DL (ref 8.7–10.5)
CHLORIDE SERPL-SCNC: 100 MMOL/L (ref 95–110)
CO2 SERPL-SCNC: 24 MMOL/L (ref 23–29)
CREAT SERPL-MCNC: 0.9 MG/DL (ref 0.5–1.4)
DIFFERENTIAL METHOD: ABNORMAL
EOSINOPHIL NFR BLD: 18 % (ref 0–8)
ERYTHROCYTE [DISTWIDTH] IN BLOOD BY AUTOMATED COUNT: 13 % (ref 11.5–14.5)
EST. GFR  (AFRICAN AMERICAN): >60 ML/MIN/1.73 M^2
EST. GFR  (NON AFRICAN AMERICAN): >60 ML/MIN/1.73 M^2
GLUCOSE SERPL-MCNC: 94 MG/DL (ref 70–110)
HCT VFR BLD AUTO: 42.4 % (ref 40–54)
HGB BLD-MCNC: 14.6 G/DL (ref 14–18)
IMM GRANULOCYTES # BLD AUTO: ABNORMAL K/UL (ref 0–0.04)
IMM GRANULOCYTES NFR BLD AUTO: ABNORMAL % (ref 0–0.5)
LYMPHOCYTES NFR BLD: 46 % (ref 18–48)
MCH RBC QN AUTO: 33.8 PG (ref 27–31)
MCHC RBC AUTO-ENTMCNC: 34.4 G/DL (ref 32–36)
MCV RBC AUTO: 98 FL (ref 82–98)
MONOCYTES NFR BLD: 6 % (ref 4–15)
MYCOPLASMA AB SER QL: NORMAL
NEUTROPHILS NFR BLD: 30 % (ref 38–73)
NRBC BLD-RTO: 0 /100 WBC
PLATELET # BLD AUTO: 181 K/UL (ref 150–350)
PLATELET BLD QL SMEAR: ABNORMAL
PMV BLD AUTO: 11 FL (ref 9.2–12.9)
POTASSIUM SERPL-SCNC: 3.7 MMOL/L (ref 3.5–5.1)
RBC # BLD AUTO: 4.32 M/UL (ref 4.6–6.2)
SODIUM SERPL-SCNC: 134 MMOL/L (ref 136–145)
WBC # BLD AUTO: 17.8 K/UL (ref 3.9–12.7)

## 2020-08-29 PROCEDURE — 36415 COLL VENOUS BLD VENIPUNCTURE: CPT

## 2020-08-29 PROCEDURE — 25000242 PHARM REV CODE 250 ALT 637 W/ HCPCS: Performed by: FAMILY MEDICINE

## 2020-08-29 PROCEDURE — 96375 TX/PRO/DX INJ NEW DRUG ADDON: CPT

## 2020-08-29 PROCEDURE — 94640 AIRWAY INHALATION TREATMENT: CPT

## 2020-08-29 PROCEDURE — 99225 PR SUBSEQUENT OBSERVATION CARE,LEVEL II: CPT | Mod: ,,, | Performed by: FAMILY MEDICINE

## 2020-08-29 PROCEDURE — 27000221 HC OXYGEN, UP TO 24 HOURS

## 2020-08-29 PROCEDURE — 85027 COMPLETE CBC AUTOMATED: CPT

## 2020-08-29 PROCEDURE — 99225 PR SUBSEQUENT OBSERVATION CARE,LEVEL II: ICD-10-PCS | Mod: ,,, | Performed by: FAMILY MEDICINE

## 2020-08-29 PROCEDURE — 21400001 HC TELEMETRY ROOM

## 2020-08-29 PROCEDURE — 80048 BASIC METABOLIC PNL TOTAL CA: CPT

## 2020-08-29 PROCEDURE — 94761 N-INVAS EAR/PLS OXIMETRY MLT: CPT

## 2020-08-29 PROCEDURE — 25000003 PHARM REV CODE 250: Performed by: HOSPITALIST

## 2020-08-29 PROCEDURE — 63600175 PHARM REV CODE 636 W HCPCS: Performed by: FAMILY MEDICINE

## 2020-08-29 PROCEDURE — G0378 HOSPITAL OBSERVATION PER HR: HCPCS

## 2020-08-29 PROCEDURE — 25000003 PHARM REV CODE 250: Performed by: FAMILY MEDICINE

## 2020-08-29 PROCEDURE — 96376 TX/PRO/DX INJ SAME DRUG ADON: CPT

## 2020-08-29 PROCEDURE — 85007 BL SMEAR W/DIFF WBC COUNT: CPT | Mod: NCS

## 2020-08-29 PROCEDURE — 86738 MYCOPLASMA ANTIBODY: CPT

## 2020-08-29 RX ORDER — GUAIFENESIN/DEXTROMETHORPHAN 100-10MG/5
10 SYRUP ORAL EVERY 4 HOURS PRN
Status: DISCONTINUED | OUTPATIENT
Start: 2020-08-29 | End: 2020-08-31 | Stop reason: HOSPADM

## 2020-08-29 RX ADMIN — GUAIFENESIN AND DEXTROMETHORPHAN 10 ML: 100; 10 SYRUP ORAL at 11:08

## 2020-08-29 RX ADMIN — ALPRAZOLAM 0.25 MG: 0.25 TABLET ORAL at 08:08

## 2020-08-29 RX ADMIN — IPRATROPIUM BROMIDE AND ALBUTEROL SULFATE 3 ML: .5; 3 SOLUTION RESPIRATORY (INHALATION) at 11:08

## 2020-08-29 RX ADMIN — MEMANTINE HYDROCHLORIDE 5 MG: 5 TABLET ORAL at 08:08

## 2020-08-29 RX ADMIN — ATORVASTATIN CALCIUM 10 MG: 10 TABLET, FILM COATED ORAL at 08:08

## 2020-08-29 RX ADMIN — RIVAROXABAN 20 MG: 10 TABLET, FILM COATED ORAL at 04:08

## 2020-08-29 RX ADMIN — AZITHROMYCIN MONOHYDRATE 250 MG: 500 INJECTION, POWDER, LYOPHILIZED, FOR SOLUTION INTRAVENOUS at 03:08

## 2020-08-29 RX ADMIN — IPRATROPIUM BROMIDE AND ALBUTEROL SULFATE 3 ML: .5; 3 SOLUTION RESPIRATORY (INHALATION) at 02:08

## 2020-08-29 RX ADMIN — CEFTRIAXONE 1 G: 1 INJECTION, SOLUTION INTRAVENOUS at 04:08

## 2020-08-29 RX ADMIN — METOPROLOL SUCCINATE 25 MG: 25 TABLET, EXTENDED RELEASE ORAL at 08:08

## 2020-08-29 RX ADMIN — CETIRIZINE HYDROCHLORIDE 10 MG: 10 TABLET, FILM COATED ORAL at 08:08

## 2020-08-29 NOTE — PLAN OF CARE
Problem: Adult Inpatient Plan of Care  Goal: Plan of Care Review  Outcome: Ongoing, Progressing  Goal: Patient-Specific Goal (Individualization)  Outcome: Ongoing, Progressing  Goal: Absence of Hospital-Acquired Illness or Injury  Outcome: Ongoing, Progressing  Goal: Optimal Comfort and Wellbeing  Outcome: Ongoing, Progressing  Goal: Readiness for Transition of Care  Outcome: Ongoing, Progressing  Goal: Rounds/Family Conference  Outcome: Ongoing, Progressing     Problem: Fluid Imbalance (Pneumonia)  Goal: Fluid Balance  Outcome: Ongoing, Progressing     Problem: Infection (Pneumonia)  Goal: Resolution of Infection Signs/Symptoms  Outcome: Ongoing, Progressing     Problem: Respiratory Compromise (Pneumonia)  Goal: Effective Oxygenation and Ventilation  Outcome: Ongoing, Progressing

## 2020-08-30 LAB
ANION GAP SERPL CALC-SCNC: 9 MMOL/L (ref 8–16)
BASOPHILS NFR BLD: 0 % (ref 0–1.9)
BUN SERPL-MCNC: 12 MG/DL (ref 8–23)
CALCIUM SERPL-MCNC: 8.7 MG/DL (ref 8.7–10.5)
CHLORIDE SERPL-SCNC: 101 MMOL/L (ref 95–110)
CO2 SERPL-SCNC: 27 MMOL/L (ref 23–29)
CREAT SERPL-MCNC: 0.9 MG/DL (ref 0.5–1.4)
DIFFERENTIAL METHOD: ABNORMAL
EOSINOPHIL NFR BLD: 20 % (ref 0–8)
ERYTHROCYTE [DISTWIDTH] IN BLOOD BY AUTOMATED COUNT: 13.1 % (ref 11.5–14.5)
EST. GFR  (AFRICAN AMERICAN): >60 ML/MIN/1.73 M^2
EST. GFR  (NON AFRICAN AMERICAN): >60 ML/MIN/1.73 M^2
GLUCOSE SERPL-MCNC: 103 MG/DL (ref 70–110)
HCT VFR BLD AUTO: 41.8 % (ref 40–54)
HGB BLD-MCNC: 14.1 G/DL (ref 14–18)
IMM GRANULOCYTES # BLD AUTO: ABNORMAL K/UL (ref 0–0.04)
IMM GRANULOCYTES NFR BLD AUTO: ABNORMAL % (ref 0–0.5)
LYMPHOCYTES NFR BLD: 48 % (ref 18–48)
MCH RBC QN AUTO: 33.5 PG (ref 27–31)
MCHC RBC AUTO-ENTMCNC: 33.7 G/DL (ref 32–36)
MCV RBC AUTO: 99 FL (ref 82–98)
MONOCYTES NFR BLD: 5 % (ref 4–15)
NEUTROPHILS NFR BLD: 27 % (ref 38–73)
NRBC BLD-RTO: 0 /100 WBC
PLATELET # BLD AUTO: 181 K/UL (ref 150–350)
PMV BLD AUTO: 10.9 FL (ref 9.2–12.9)
POTASSIUM SERPL-SCNC: 3.9 MMOL/L (ref 3.5–5.1)
RBC # BLD AUTO: 4.21 M/UL (ref 4.6–6.2)
SODIUM SERPL-SCNC: 137 MMOL/L (ref 136–145)
WBC # BLD AUTO: 16.51 K/UL (ref 3.9–12.7)

## 2020-08-30 PROCEDURE — 85007 BL SMEAR W/DIFF WBC COUNT: CPT

## 2020-08-30 PROCEDURE — 94640 AIRWAY INHALATION TREATMENT: CPT

## 2020-08-30 PROCEDURE — 99225 PR SUBSEQUENT OBSERVATION CARE,LEVEL II: ICD-10-PCS | Mod: ,,, | Performed by: FAMILY MEDICINE

## 2020-08-30 PROCEDURE — 63600175 PHARM REV CODE 636 W HCPCS: Performed by: FAMILY MEDICINE

## 2020-08-30 PROCEDURE — 27000221 HC OXYGEN, UP TO 24 HOURS

## 2020-08-30 PROCEDURE — 25000242 PHARM REV CODE 250 ALT 637 W/ HCPCS: Performed by: FAMILY MEDICINE

## 2020-08-30 PROCEDURE — 94761 N-INVAS EAR/PLS OXIMETRY MLT: CPT

## 2020-08-30 PROCEDURE — 99225 PR SUBSEQUENT OBSERVATION CARE,LEVEL II: CPT | Mod: ,,, | Performed by: FAMILY MEDICINE

## 2020-08-30 PROCEDURE — 36415 COLL VENOUS BLD VENIPUNCTURE: CPT

## 2020-08-30 PROCEDURE — 80048 BASIC METABOLIC PNL TOTAL CA: CPT

## 2020-08-30 PROCEDURE — 21400001 HC TELEMETRY ROOM

## 2020-08-30 PROCEDURE — G0378 HOSPITAL OBSERVATION PER HR: HCPCS

## 2020-08-30 PROCEDURE — 85027 COMPLETE CBC AUTOMATED: CPT

## 2020-08-30 PROCEDURE — 96376 TX/PRO/DX INJ SAME DRUG ADON: CPT

## 2020-08-30 PROCEDURE — 25000003 PHARM REV CODE 250: Performed by: FAMILY MEDICINE

## 2020-08-30 PROCEDURE — 25000003 PHARM REV CODE 250: Performed by: HOSPITALIST

## 2020-08-30 RX ORDER — METHYLPREDNISOLONE SOD SUCC 125 MG
80 VIAL (EA) INJECTION EVERY 12 HOURS
Status: DISCONTINUED | OUTPATIENT
Start: 2020-08-30 | End: 2020-08-31 | Stop reason: HOSPADM

## 2020-08-30 RX ORDER — IPRATROPIUM BROMIDE AND ALBUTEROL SULFATE 2.5; .5 MG/3ML; MG/3ML
SOLUTION RESPIRATORY (INHALATION)
Status: DISPENSED
Start: 2020-08-30 | End: 2020-08-31

## 2020-08-30 RX ORDER — METHYLPREDNISOLONE SOD SUCC 125 MG
80 VIAL (EA) INJECTION EVERY 6 HOURS
Status: DISCONTINUED | OUTPATIENT
Start: 2020-08-30 | End: 2020-08-30

## 2020-08-30 RX ADMIN — ATORVASTATIN CALCIUM 10 MG: 10 TABLET, FILM COATED ORAL at 09:08

## 2020-08-30 RX ADMIN — METHYLPREDNISOLONE SODIUM SUCCINATE 80 MG: 125 INJECTION, POWDER, FOR SOLUTION INTRAMUSCULAR; INTRAVENOUS at 03:08

## 2020-08-30 RX ADMIN — AZITHROMYCIN MONOHYDRATE 250 MG: 500 INJECTION, POWDER, LYOPHILIZED, FOR SOLUTION INTRAVENOUS at 03:08

## 2020-08-30 RX ADMIN — CEFTRIAXONE 1 G: 1 INJECTION, SOLUTION INTRAVENOUS at 05:08

## 2020-08-30 RX ADMIN — ALPRAZOLAM 0.25 MG: 0.25 TABLET ORAL at 09:08

## 2020-08-30 RX ADMIN — IPRATROPIUM BROMIDE AND ALBUTEROL SULFATE 3 ML: .5; 3 SOLUTION RESPIRATORY (INHALATION) at 03:08

## 2020-08-30 RX ADMIN — MEMANTINE HYDROCHLORIDE 5 MG: 5 TABLET ORAL at 08:08

## 2020-08-30 RX ADMIN — ALPRAZOLAM 0.25 MG: 0.25 TABLET ORAL at 08:08

## 2020-08-30 RX ADMIN — IPRATROPIUM BROMIDE AND ALBUTEROL SULFATE 3 ML: .5; 3 SOLUTION RESPIRATORY (INHALATION) at 09:08

## 2020-08-30 RX ADMIN — RIVAROXABAN 20 MG: 10 TABLET, FILM COATED ORAL at 05:08

## 2020-08-30 RX ADMIN — GUAIFENESIN AND DEXTROMETHORPHAN 10 ML: 100; 10 SYRUP ORAL at 01:08

## 2020-08-30 RX ADMIN — CETIRIZINE HYDROCHLORIDE 10 MG: 10 TABLET, FILM COATED ORAL at 08:08

## 2020-08-30 RX ADMIN — MEMANTINE HYDROCHLORIDE 5 MG: 5 TABLET ORAL at 09:08

## 2020-08-30 RX ADMIN — METOPROLOL SUCCINATE 25 MG: 25 TABLET, EXTENDED RELEASE ORAL at 08:08

## 2020-08-30 NOTE — PROGRESS NOTES
[facility] Medicine  Progress Note    Patient Name: Benjamin Manrique  MRN: [id number]  Patient Class: OP- Observation   Admission Date: 8/28/2020  Length of Stay: 0 days  Attending Physician: Chelsie Talbot MD  Primary Care Provider: Jacky Cohn MD        Subjective:     Principal Problem:Hypoxia        HPI:  81 yo male with PMH of Alzheimer disease, A-fib on Xarelto, hyperlipidemia who presents to the ER with shortness of breath, cough. Patient was told to come to the ER for COVID testing but on arrival was found to be hypoxic with O2 sats around 90%. Blood work showed WBC 16,000. Wife reports that he has been sitting up on the side of the bed in the middle of the night trying to catch his breath. Has a worsening cough. Mild leg swelling. Wife and daughter report worsening confusion over the last few days. CXR essentially normal though given hypoxia on O2 sats and ABG with Pa02 62, hospital team called for admission for possible developing pneumonia.     Overview/Hospital Course:  08/29/2020  Patient admitted started on IV azithromycin.  Given DuoNeb treatments for wheezing.  Still with hypoxia requiring oxygen.  Attempting to wean.  Will need another day of IV antibiotics.  If no change, will change antibiotics.    8/30/2020  Patient continued on IV antibiotics.  Continued on scheduled DuoNebs.  Still requiring oxygen.  Continue to wean as tolerated.  Had a long discussion with patient's wife and 2 adult children regarding further treatment in care of patient after discharge.  Will consult case management in a.m. for home hospice.    Interval History:  No acute events    Review of Systems   Unable to perform ROS: Dementia     Objective:     Vital Signs (Most Recent):  Temp: 97 °F (36.1 °C) (08/30/20 1451)  Pulse: 75 (08/30/20 1600)  Resp: 18 (08/30/20 1451)  BP: 120/68 (08/30/20 1451)  SpO2: 100 % (08/30/20 1600) Vital Signs (24h Range):  Temp:  [96.7 °F (35.9  °C)-97.6 °F (36.4 °C)] 97 °F (36.1 °C)  Pulse:  [74-83] 75  Resp:  [16-18] 18  SpO2:  [95 %-100 %] 100 %  BP: (114-144)/(61-74) 120/68     Weight: 81.7 kg (180 lb 1.9 oz)  Body mass index is 23.13 kg/m².    Intake/Output Summary (Last 24 hours) at 8/30/2020 1847  Last data filed at 8/30/2020 1517  Gross per 24 hour   Intake 250 ml   Output --   Net 250 ml      Physical Exam  Vitals signs reviewed.   Constitutional:       Appearance: Normal appearance. He is not ill-appearing or toxic-appearing.   HENT:      Head: Normocephalic and atraumatic.      Right Ear: External ear normal.      Left Ear: External ear normal.      Nose: Nose normal.      Mouth/Throat:      Mouth: Mucous membranes are moist.   Eyes:      Conjunctiva/sclera: Conjunctivae normal.   Cardiovascular:      Rate and Rhythm: Normal rate and regular rhythm.      Pulses: Normal pulses.      Heart sounds: No murmur. No gallop.    Pulmonary:      Effort: Pulmonary effort is normal.      Comments: Moderate to poor air movement, biphasic wheezing, no rhonchi or crackles, chest tightness  Abdominal:      General: Abdomen is flat. Bowel sounds are normal. There is no distension.      Tenderness: There is no abdominal tenderness.   Musculoskeletal:      Right lower leg: No edema.      Left lower leg: No edema.   Skin:     General: Skin is warm and dry.   Neurological:      Mental Status: He is alert. Mental status is at baseline. He is disoriented.   Psychiatric:         Mood and Affect: Mood normal.         Behavior: Behavior normal.         Significant Labs:   Recent Lab Results       08/30/20  0714        Anion Gap 9     Basophil% 0.0  Comment:  Corrected result; previously reported as 0.7 on 08/30/2020 at 08:55.[C]     BUN, Bld 12     Calcium 8.7     Chloride 101     CO2 27     Creatinine 0.9     Differential Method Manual  Comment:  Corrected result; previously reported as Automated on 08/30/2020 at   08:55.  [C]     eGFR if African American >60.0     eGFR  if non  >60.0  Comment:  Calculation used to obtain the estimated glomerular filtration  rate (eGFR) is the CKD-EPI equation.        Eosinophil% 20.0  Comment:  Corrected result; previously reported as 19.4 on 08/30/2020 at 08:55.[C]     Glucose 103     Gran% 27.0  Comment:  Corrected result; previously reported as 22.8 on 08/30/2020 at 08:55.[C]     Hematocrit 41.8     Hemoglobin 14.1     Immature Grans (Abs) Test Not Performed  Comment:  Mild elevation in immature granulocytes is non specific and   can be seen in a variety of conditions including stress response,   acute inflammation, trauma and pregnancy. Correlation with other   laboratory and clinical findings is essential.  Corrected result; previously reported as 0.03 on 08/30/2020 at 08:55.  [C]     Immature Granulocytes Test Not Performed  Comment:  Corrected result; previously reported as 0.2 on 08/30/2020 at 08:55.[C]     Lymph% 48.0  Comment:  Corrected result; previously reported as 51.0 on 08/30/2020 at 08:55.[C]     MCH 33.5     MCHC 33.7     MCV 99     Mono% 5.0  Comment:  Corrected result; previously reported as 5.9 on 08/30/2020 at 08:55.[C]     MPV 10.9     nRBC 0     Platelets 181     Potassium 3.9     RBC 4.21     RDW 13.1     Sodium 137     WBC 16.51         All pertinent labs within the past 24 hours have been reviewed.    Significant Imaging: I have reviewed all pertinent imaging results/findings within the past 24 hours.      Assessment/Plan:      * Hypoxia  Possibly pneumonia vs CHF with elevated BNP (slight) vs chronic interstitial lung disease  Worsening confusion per family, paroxsymal nocturnal dyspnea though no LYNNE  Afebrile, monitor fever curve  Leukocytosis on admission, monitor daily CBC  Start on IV Rocephin and Azithromycin  IVFs NS @ 75 mL/hr        Atrial fibrillation  Rate controlled  Continue home Xarelto and metoprolol  Monitor on telemetry      Late onset Alzheimer's disease without behavioral  disturbance  Continue home medications  Fall precautions  One to one sitter    08/30/2020  Had a long discussion with family regarding care planning  Plan to consult case management in a.m. for home hospice for assistance with medications, long-term care needs    Community acquired pneumonia of right lung  Will continue on IV azithromycin  WBC is stable  Patient still with increased oxygen requirement  Continuing nebulized treatment  Repeat chest x-ray in AM    08/30/2020  Continue IV azithromycin  Patient is clinically improving though still with an oxygen requirement is still requiring nebulizer treatments        VTE Risk Mitigation (From admission, onward)         Ordered     rivaroxaban tablet 20 mg  With dinner      08/28/20 1806     IP VTE HIGH RISK PATIENT  Once      08/28/20 1806     Place sequential compression device  Until discontinued      08/28/20 1756                Discharge Planning   CHEYANNE:      Code Status: Full Code   Is the patient medically ready for discharge?:     Reason for patient still in hospital (select all that apply): Treatment                     Chelsie Talbot MD  Department of Hospital Medicine   [facility]

## 2020-08-30 NOTE — ASSESSMENT & PLAN NOTE
Will continue on IV azithromycin  WBC is stable  Patient still with increased oxygen requirement  Continuing nebulized treatment  Repeat chest x-ray in AM    08/30/2020  Continue IV azithromycin  Patient is clinically improving though still with an oxygen requirement is still requiring nebulizer treatments

## 2020-08-30 NOTE — ASSESSMENT & PLAN NOTE
Continue home medications  Fall precautions  One to one sitter    08/30/2020  Had a long discussion with family regarding care planning  Plan to consult case management in a.m. for home hospice for assistance with medications, long-term care needs

## 2020-08-30 NOTE — ASSESSMENT & PLAN NOTE
Will continue on IV azithromycin  WBC is stable  Patient still with increased oxygen requirement  Continuing nebulized treatment  Repeat chest x-ray in AM

## 2020-08-30 NOTE — PLAN OF CARE
Problem: Adult Inpatient Plan of Care  Goal: Plan of Care Review  8/30/2020 0252 by Junior Beauchamp RN  Outcome: Ongoing, Progressing  8/30/2020 0110 by Junior Beauchamp RN  Outcome: Ongoing, Progressing  Goal: Patient-Specific Goal (Individualization)  8/30/2020 0252 by Junior Beauchamp RN  Outcome: Ongoing, Progressing  8/30/2020 0110 by Junior Beauchamp RN  Outcome: Ongoing, Progressing  Goal: Absence of Hospital-Acquired Illness or Injury  8/30/2020 0252 by Junior Beauchamp RN  Outcome: Ongoing, Progressing  8/30/2020 0110 by Junior Beauchamp RN  Outcome: Ongoing, Progressing  Goal: Optimal Comfort and Wellbeing  8/30/2020 0252 by Junior Beauchamp RN  Outcome: Ongoing, Progressing  8/30/2020 0110 by Junior Beauchamp RN  Outcome: Ongoing, Progressing  Goal: Readiness for Transition of Care  8/30/2020 0252 by Junior Beauchamp RN  Outcome: Ongoing, Progressing  8/30/2020 0110 by Junior Beauchamp RN  Outcome: Ongoing, Progressing  Goal: Rounds/Family Conference  8/30/2020 0252 by Junior Beauchamp RN  Outcome: Ongoing, Progressing  8/30/2020 0110 by Junior Beauchamp RN  Outcome: Ongoing, Progressing     Problem: Fluid Imbalance (Pneumonia)  Goal: Fluid Balance  8/30/2020 0252 by Junior Beauchamp RN  Outcome: Ongoing, Progressing  8/30/2020 0110 by Junior Beauchamp RN  Outcome: Ongoing, Progressing     Problem: Infection (Pneumonia)  Goal: Resolution of Infection Signs/Symptoms  8/30/2020 0252 by Junior Beauchamp RN  Outcome: Ongoing, Progressing  8/30/2020 0110 by Junior Beauchamp RN  Outcome: Ongoing, Progressing     Problem: Respiratory Compromise (Pneumonia)  Goal: Effective Oxygenation and Ventilation  8/30/2020 0252 by Junior Beauchamp RN  Outcome: Ongoing, Progressing  8/30/2020 0110 by Junior Beauchamp RN  Outcome: Ongoing, Progressing     Problem: Skin Injury Risk Increased  Goal: Skin Health and Integrity  8/30/2020 0252 by Junior Beauchamp RN  Outcome: Ongoing, Progressing  8/30/2020 0110 by Junior Beauchamp RN  Outcome: Ongoing, Progressing

## 2020-08-30 NOTE — PLAN OF CARE
Problem: Fluid Imbalance (Pneumonia)  Goal: Fluid Balance  Outcome: Ongoing, Progressing     Problem: Infection (Pneumonia)  Goal: Resolution of Infection Signs/Symptoms  Outcome: Ongoing, Progressing     Problem: Respiratory Compromise (Pneumonia)  Goal: Effective Oxygenation and Ventilation  Outcome: Ongoing, Progressing     Problem: Adult Inpatient Plan of Care  Goal: Plan of Care Review  Outcome: Ongoing, Progressing  Goal: Patient-Specific Goal (Individualization)  Outcome: Ongoing, Progressing  Goal: Absence of Hospital-Acquired Illness or Injury  Outcome: Ongoing, Progressing  Goal: Optimal Comfort and Wellbeing  Outcome: Ongoing, Progressing  Goal: Readiness for Transition of Care  Outcome: Ongoing, Progressing  Goal: Rounds/Family Conference  Outcome: Ongoing, Progressing

## 2020-08-30 NOTE — SUBJECTIVE & OBJECTIVE
Interval History:  No acute events    Review of Systems   Unable to perform ROS: Dementia     Objective:     Vital Signs (Most Recent):  Temp: 97 °F (36.1 °C) (08/30/20 1451)  Pulse: 75 (08/30/20 1600)  Resp: 18 (08/30/20 1451)  BP: 120/68 (08/30/20 1451)  SpO2: 100 % (08/30/20 1600) Vital Signs (24h Range):  Temp:  [96.7 °F (35.9 °C)-97.6 °F (36.4 °C)] 97 °F (36.1 °C)  Pulse:  [74-83] 75  Resp:  [16-18] 18  SpO2:  [95 %-100 %] 100 %  BP: (114-144)/(61-74) 120/68     Weight: 81.7 kg (180 lb 1.9 oz)  Body mass index is 23.13 kg/m².    Intake/Output Summary (Last 24 hours) at 8/30/2020 1847  Last data filed at 8/30/2020 1517  Gross per 24 hour   Intake 250 ml   Output --   Net 250 ml      Physical Exam  Vitals signs reviewed.   Constitutional:       Appearance: Normal appearance. He is not ill-appearing or toxic-appearing.   HENT:      Head: Normocephalic and atraumatic.      Right Ear: External ear normal.      Left Ear: External ear normal.      Nose: Nose normal.      Mouth/Throat:      Mouth: Mucous membranes are moist.   Eyes:      Conjunctiva/sclera: Conjunctivae normal.   Cardiovascular:      Rate and Rhythm: Normal rate and regular rhythm.      Pulses: Normal pulses.      Heart sounds: No murmur. No gallop.    Pulmonary:      Effort: Pulmonary effort is normal.      Comments: Moderate to poor air movement, biphasic wheezing, no rhonchi or crackles, chest tightness  Abdominal:      General: Abdomen is flat. Bowel sounds are normal. There is no distension.      Tenderness: There is no abdominal tenderness.   Musculoskeletal:      Right lower leg: No edema.      Left lower leg: No edema.   Skin:     General: Skin is warm and dry.   Neurological:      Mental Status: He is alert. Mental status is at baseline. He is disoriented.   Psychiatric:         Mood and Affect: Mood normal.         Behavior: Behavior normal.         Significant Labs:   Recent Lab Results       08/30/20  0714        Anion Gap 9     Basophil%  0.0  Comment:  Corrected result; previously reported as 0.7 on 08/30/2020 at 08:55.[C]     BUN, Bld 12     Calcium 8.7     Chloride 101     CO2 27     Creatinine 0.9     Differential Method Manual  Comment:  Corrected result; previously reported as Automated on 08/30/2020 at   08:55.  [C]     eGFR if African American >60.0     eGFR if non  >60.0  Comment:  Calculation used to obtain the estimated glomerular filtration  rate (eGFR) is the CKD-EPI equation.        Eosinophil% 20.0  Comment:  Corrected result; previously reported as 19.4 on 08/30/2020 at 08:55.[C]     Glucose 103     Gran% 27.0  Comment:  Corrected result; previously reported as 22.8 on 08/30/2020 at 08:55.[C]     Hematocrit 41.8     Hemoglobin 14.1     Immature Grans (Abs) Test Not Performed  Comment:  Mild elevation in immature granulocytes is non specific and   can be seen in a variety of conditions including stress response,   acute inflammation, trauma and pregnancy. Correlation with other   laboratory and clinical findings is essential.  Corrected result; previously reported as 0.03 on 08/30/2020 at 08:55.  [C]     Immature Granulocytes Test Not Performed  Comment:  Corrected result; previously reported as 0.2 on 08/30/2020 at 08:55.[C]     Lymph% 48.0  Comment:  Corrected result; previously reported as 51.0 on 08/30/2020 at 08:55.[C]     MCH 33.5     MCHC 33.7     MCV 99     Mono% 5.0  Comment:  Corrected result; previously reported as 5.9 on 08/30/2020 at 08:55.[C]     MPV 10.9     nRBC 0     Platelets 181     Potassium 3.9     RBC 4.21     RDW 13.1     Sodium 137     WBC 16.51         All pertinent labs within the past 24 hours have been reviewed.    Significant Imaging: I have reviewed all pertinent imaging results/findings within the past 24 hours.

## 2020-08-30 NOTE — PLAN OF CARE
Problem: Adult Inpatient Plan of Care  Goal: Plan of Care Review  Outcome: Ongoing, Progressing  Goal: Patient-Specific Goal (Individualization)  Outcome: Ongoing, Progressing  Goal: Absence of Hospital-Acquired Illness or Injury  Outcome: Ongoing, Progressing  Goal: Optimal Comfort and Wellbeing  Outcome: Ongoing, Progressing  Goal: Readiness for Transition of Care  Outcome: Ongoing, Progressing  Goal: Rounds/Family Conference  Outcome: Ongoing, Progressing     Problem: Fluid Imbalance (Pneumonia)  Goal: Fluid Balance  Outcome: Ongoing, Progressing     Problem: Infection (Pneumonia)  Goal: Resolution of Infection Signs/Symptoms  Outcome: Ongoing, Progressing     Problem: Respiratory Compromise (Pneumonia)  Goal: Effective Oxygenation and Ventilation  Outcome: Ongoing, Progressing     Problem: Skin Injury Risk Increased  Goal: Skin Health and Integrity  Outcome: Ongoing, Progressing

## 2020-08-30 NOTE — HOSPITAL COURSE
08/29/2020  Patient admitted started on IV azithromycin.  Given DuoNeb treatments for wheezing.  Still with hypoxia requiring oxygen.  Attempting to wean.  Will need another day of IV antibiotics.  If no change, will change antibiotics.    8/30/2020  Patient continued on IV antibiotics.  Continued on scheduled DuoNebs.  Still requiring oxygen.  Continue to wean as tolerated.  Had a long discussion with patient's wife and 2 adult children regarding further treatment in care of patient after discharge.  Will consult case management in a.m. for home hospice.

## 2020-08-30 NOTE — SUBJECTIVE & OBJECTIVE
Interval History:  No acute events  Review of Systems   Unable to perform ROS: Dementia   Constitutional: Negative for fatigue and fever.   Respiratory: Positive for cough (Worsening) and wheezing. Negative for shortness of breath.    Cardiovascular: Negative for chest pain.     Objective:     Vital Signs (Most Recent):  Temp: 97.2 °F (36.2 °C) (08/29/20 1435)  Pulse: 75 (08/29/20 1435)  Resp: 18 (08/29/20 1435)  BP: (!) 148/68 (08/29/20 1435)  SpO2: 98 % (08/29/20 1922) Vital Signs (24h Range):  Temp:  [96.7 °F (35.9 °C)-97.6 °F (36.4 °C)] 97.2 °F (36.2 °C)  Pulse:  [67-89] 75  Resp:  [16-18] 18  SpO2:  [94 %-98 %] 98 %  BP: (128-172)/(64-77) 148/68     Weight: 81.7 kg (180 lb 1.9 oz)  Body mass index is 23.13 kg/m².    Intake/Output Summary (Last 24 hours) at 8/29/2020 1924  Last data filed at 8/29/2020 1700  Gross per 24 hour   Intake 1335 ml   Output --   Net 1335 ml      Physical Exam  Vitals signs reviewed.   Constitutional:       Appearance: Normal appearance. He is not ill-appearing or toxic-appearing.   HENT:      Head: Normocephalic and atraumatic.      Right Ear: External ear normal.      Left Ear: External ear normal.      Nose: Nose normal.      Mouth/Throat:      Mouth: Mucous membranes are moist.   Eyes:      Conjunctiva/sclera: Conjunctivae normal.   Cardiovascular:      Rate and Rhythm: Normal rate. Rhythm irregular.      Pulses: Normal pulses.      Heart sounds: No murmur. No gallop.    Pulmonary:      Comments: Mildly increased work of breathing, no tachypnea, no respiratory distress, occasional end-expiratory wheezes, no rales, no rhonchi  Abdominal:      General: Abdomen is flat. Bowel sounds are normal. There is no distension.      Tenderness: There is no abdominal tenderness.   Musculoskeletal:      Right lower leg: No edema.      Left lower leg: No edema.   Skin:     General: Skin is warm and dry.   Neurological:      Mental Status: He is alert. Mental status is at baseline. He is  disoriented.   Psychiatric:         Mood and Affect: Mood normal.         Significant Labs:   Recent Lab Results       08/29/20  0656        Anion Gap 10     Basophil% 0.0  Comment:  Corrected result; previously reported as 0.6 on 08/29/2020 at 08:29.[C]     BUN, Bld 13     Calcium 8.6     Chloride 100     CO2 24     Creatinine 0.9     Differential Method Manual  Comment:  Corrected result; previously reported as Automated on 08/29/2020 at   08:29.  [C]     eGFR if African American >60.0     eGFR if non  >60.0  Comment:  Calculation used to obtain the estimated glomerular filtration  rate (eGFR) is the CKD-EPI equation.        Eosinophil% 18.0  Comment:  Corrected result; previously reported as 19.6 on 08/29/2020 at 08:29.[C]     Glucose 94     Gran% 30.0  Comment:  Corrected result; previously reported as 22.6 on 08/29/2020 at 08:29.[C]     Hematocrit 42.4     Hemoglobin 14.6     Immature Grans (Abs) Test Not Performed  Comment:  Mild elevation in immature granulocytes is non specific and   can be seen in a variety of conditions including stress response,   acute inflammation, trauma and pregnancy. Correlation with other   laboratory and clinical findings is essential.  Corrected result; previously reported as 0.03 on 08/29/2020 at 08:29.  [C]     Immature Granulocytes Test Not Performed  Comment:  Corrected result; previously reported as 0.2 on 08/29/2020 at 08:29.[C]     Lymph% 46.0  Comment:  Corrected result; previously reported as 51.6 on 08/29/2020 at 08:29.[C]     MCH 33.8     MCHC 34.4     MCV 98     Mono% 6.0  Comment:  Corrected result; previously reported as 5.4 on 08/29/2020 at 08:29.[C]     MPV 11.0     nRBC 0     Platelet Estimate Appears normal     Platelets 181     Potassium 3.7     RBC 4.32     RDW 13.0     Sodium 134     WBC 17.80         All pertinent labs within the past 24 hours have been reviewed.    Significant Imaging: I have reviewed all pertinent imaging results/findings  within the past 24 hours.

## 2020-08-30 NOTE — PROGRESS NOTES
[facility] Hospital Medicine  Progress Note    Patient Name: Benjamin Manrique  MRN: [id number]  Patient Class: OP- Observation   Admission Date: 8/28/2020  Length of Stay: 0 days  Attending Physician: Chelsie Talbot MD  Primary Care Provider: Jacky Cohn MD        Subjective:     Principal Problem:Hypoxia        HPI:  81 yo male with PMH of Alzheimer disease, A-fib on Xarelto, hyperlipidemia who presents to the ER with shortness of breath, cough. Patient was told to come to the ER for COVID testing but on arrival was found to be hypoxic with O2 sats around 90%. Blood work showed WBC 16,000. Wife reports that he has been sitting up on the side of the bed in the middle of the night trying to catch his breath. Has a worsening cough. Mild leg swelling. Wife and daughter report worsening confusion over the last few days. CXR essentially normal though given hypoxia on O2 sats and ABG with Pa02 62, hospital team called for admission for possible developing pneumonia.     Overview/Hospital Course:  08/29/2020  Patient admitted started on IV azithromycin.  Given DuoNeb treatments for wheezing.  Still with hypoxia requiring oxygen.  Attempting to wean.  Will need another day of IV antibiotics.  If no change, will change antibiotics.    Interval History:  No acute events  Review of Systems   Unable to perform ROS: Dementia   Constitutional: Negative for fatigue and fever.   Respiratory: Positive for cough (Worsening) and wheezing. Negative for shortness of breath.    Cardiovascular: Negative for chest pain.     Objective:     Vital Signs (Most Recent):  Temp: 97.2 °F (36.2 °C) (08/29/20 1435)  Pulse: 75 (08/29/20 1435)  Resp: 18 (08/29/20 1435)  BP: (!) 148/68 (08/29/20 1435)  SpO2: 98 % (08/29/20 1922) Vital Signs (24h Range):  Temp:  [96.7 °F (35.9 °C)-97.6 °F (36.4 °C)] 97.2 °F (36.2 °C)  Pulse:  [67-89] 75  Resp:  [16-18] 18  SpO2:  [94 %-98 %] 98 %  BP: (128-172)/(64-77) 148/68      Weight: 81.7 kg (180 lb 1.9 oz)  Body mass index is 23.13 kg/m².    Intake/Output Summary (Last 24 hours) at 8/29/2020 1924  Last data filed at 8/29/2020 1700  Gross per 24 hour   Intake 1335 ml   Output --   Net 1335 ml      Physical Exam  Vitals signs reviewed.   Constitutional:       Appearance: Normal appearance. He is not ill-appearing or toxic-appearing.   HENT:      Head: Normocephalic and atraumatic.      Right Ear: External ear normal.      Left Ear: External ear normal.      Nose: Nose normal.      Mouth/Throat:      Mouth: Mucous membranes are moist.   Eyes:      Conjunctiva/sclera: Conjunctivae normal.   Cardiovascular:      Rate and Rhythm: Normal rate. Rhythm irregular.      Pulses: Normal pulses.      Heart sounds: No murmur. No gallop.    Pulmonary:      Comments: Mildly increased work of breathing, no tachypnea, no respiratory distress, occasional end-expiratory wheezes, no rales, no rhonchi  Abdominal:      General: Abdomen is flat. Bowel sounds are normal. There is no distension.      Tenderness: There is no abdominal tenderness.   Musculoskeletal:      Right lower leg: No edema.      Left lower leg: No edema.   Skin:     General: Skin is warm and dry.   Neurological:      Mental Status: He is alert. Mental status is at baseline. He is disoriented.   Psychiatric:         Mood and Affect: Mood normal.         Significant Labs:   Recent Lab Results       08/29/20  0656        Anion Gap 10     Basophil% 0.0  Comment:  Corrected result; previously reported as 0.6 on 08/29/2020 at 08:29.[C]     BUN, Bld 13     Calcium 8.6     Chloride 100     CO2 24     Creatinine 0.9     Differential Method Manual  Comment:  Corrected result; previously reported as Automated on 08/29/2020 at   08:29.  [C]     eGFR if African American >60.0     eGFR if non  >60.0  Comment:  Calculation used to obtain the estimated glomerular filtration  rate (eGFR) is the CKD-EPI equation.        Eosinophil%  18.0  Comment:  Corrected result; previously reported as 19.6 on 08/29/2020 at 08:29.[C]     Glucose 94     Gran% 30.0  Comment:  Corrected result; previously reported as 22.6 on 08/29/2020 at 08:29.[C]     Hematocrit 42.4     Hemoglobin 14.6     Immature Grans (Abs) Test Not Performed  Comment:  Mild elevation in immature granulocytes is non specific and   can be seen in a variety of conditions including stress response,   acute inflammation, trauma and pregnancy. Correlation with other   laboratory and clinical findings is essential.  Corrected result; previously reported as 0.03 on 08/29/2020 at 08:29.  [C]     Immature Granulocytes Test Not Performed  Comment:  Corrected result; previously reported as 0.2 on 08/29/2020 at 08:29.[C]     Lymph% 46.0  Comment:  Corrected result; previously reported as 51.6 on 08/29/2020 at 08:29.[C]     MCH 33.8     MCHC 34.4     MCV 98     Mono% 6.0  Comment:  Corrected result; previously reported as 5.4 on 08/29/2020 at 08:29.[C]     MPV 11.0     nRBC 0     Platelet Estimate Appears normal     Platelets 181     Potassium 3.7     RBC 4.32     RDW 13.0     Sodium 134     WBC 17.80         All pertinent labs within the past 24 hours have been reviewed.    Significant Imaging: I have reviewed all pertinent imaging results/findings within the past 24 hours.      Assessment/Plan:      * Hypoxia  Possibly pneumonia vs CHF with elevated BNP (slight) vs chronic interstitial lung disease  Worsening confusion per family, paroxsymal nocturnal dyspnea though no LYNNE  Afebrile, monitor fever curve  Leukocytosis on admission, monitor daily CBC  Start on IV Rocephin and Azithromycin  IVFs NS @ 75 mL/hr        Atrial fibrillation  Rate controlled  Continue home Xarelto and metoprolol  Monitor on telemetry      Late onset Alzheimer's disease without behavioral disturbance  Continue home medications  Fall precautions  One to one sitter      Community acquired pneumonia of right lung  Will continue  on IV azithromycin  WBC is stable  Patient still with increased oxygen requirement  Continuing nebulized treatment  Repeat chest x-ray in AM        VTE Risk Mitigation (From admission, onward)         Ordered     rivaroxaban tablet 20 mg  With dinner      08/28/20 1806     IP VTE HIGH RISK PATIENT  Once      08/28/20 1806     Place sequential compression device  Until discontinued      08/28/20 1756                Discharge Planning   CHEYANNE:      Code Status: Full Code   Is the patient medically ready for discharge?:     Reason for patient still in hospital (select all that apply): Patient unstable and Treatment                     Chelsie Talbot MD  Department of Hospital Medicine   [facility]

## 2020-08-31 VITALS
SYSTOLIC BLOOD PRESSURE: 109 MMHG | RESPIRATION RATE: 18 BRPM | DIASTOLIC BLOOD PRESSURE: 56 MMHG | OXYGEN SATURATION: 94 % | BODY MASS INDEX: 23.12 KG/M2 | TEMPERATURE: 97 F | HEIGHT: 74 IN | WEIGHT: 180.13 LBS | HEART RATE: 80 BPM

## 2020-08-31 LAB
ANION GAP SERPL CALC-SCNC: 13 MMOL/L (ref 8–16)
BASOPHILS # BLD AUTO: 0.01 K/UL (ref 0–0.2)
BASOPHILS NFR BLD: 0.1 % (ref 0–1.9)
BUN SERPL-MCNC: 13 MG/DL (ref 8–23)
CALCIUM SERPL-MCNC: 9.6 MG/DL (ref 8.7–10.5)
CHLORIDE SERPL-SCNC: 99 MMOL/L (ref 95–110)
CO2 SERPL-SCNC: 24 MMOL/L (ref 23–29)
CREAT SERPL-MCNC: 0.8 MG/DL (ref 0.5–1.4)
DIFFERENTIAL METHOD: ABNORMAL
EOSINOPHIL # BLD AUTO: 0 K/UL (ref 0–0.5)
EOSINOPHIL NFR BLD: 0.1 % (ref 0–8)
ERYTHROCYTE [DISTWIDTH] IN BLOOD BY AUTOMATED COUNT: 12.5 % (ref 11.5–14.5)
EST. GFR  (AFRICAN AMERICAN): >60 ML/MIN/1.73 M^2
EST. GFR  (NON AFRICAN AMERICAN): >60 ML/MIN/1.73 M^2
GLUCOSE SERPL-MCNC: 184 MG/DL (ref 70–110)
HCT VFR BLD AUTO: 42.7 % (ref 40–54)
HGB BLD-MCNC: 14.5 G/DL (ref 14–18)
IMM GRANULOCYTES # BLD AUTO: 0.04 K/UL (ref 0–0.04)
IMM GRANULOCYTES NFR BLD AUTO: 0.3 % (ref 0–0.5)
LYMPHOCYTES # BLD AUTO: 2.2 K/UL (ref 1–4.8)
LYMPHOCYTES NFR BLD: 18.9 % (ref 18–48)
MCH RBC QN AUTO: 33.2 PG (ref 27–31)
MCHC RBC AUTO-ENTMCNC: 34 G/DL (ref 32–36)
MCV RBC AUTO: 98 FL (ref 82–98)
MONOCYTES # BLD AUTO: 0.2 K/UL (ref 0.3–1)
MONOCYTES NFR BLD: 1.5 % (ref 4–15)
NEUTROPHILS # BLD AUTO: 9.3 K/UL (ref 1.8–7.7)
NEUTROPHILS NFR BLD: 79.1 % (ref 38–73)
NRBC BLD-RTO: 0 /100 WBC
PLATELET # BLD AUTO: 194 K/UL (ref 150–350)
PMV BLD AUTO: 11 FL (ref 9.2–12.9)
POTASSIUM SERPL-SCNC: 4.4 MMOL/L (ref 3.5–5.1)
RBC # BLD AUTO: 4.37 M/UL (ref 4.6–6.2)
SODIUM SERPL-SCNC: 136 MMOL/L (ref 136–145)
WBC # BLD AUTO: 11.72 K/UL (ref 3.9–12.7)

## 2020-08-31 PROCEDURE — 99217 PR OBSERVATION CARE DISCHARGE: ICD-10-PCS | Mod: ,,, | Performed by: FAMILY MEDICINE

## 2020-08-31 PROCEDURE — 63600175 PHARM REV CODE 636 W HCPCS: Performed by: FAMILY MEDICINE

## 2020-08-31 PROCEDURE — 80048 BASIC METABOLIC PNL TOTAL CA: CPT

## 2020-08-31 PROCEDURE — 94760 N-INVAS EAR/PLS OXIMETRY 1: CPT

## 2020-08-31 PROCEDURE — 25000242 PHARM REV CODE 250 ALT 637 W/ HCPCS: Performed by: FAMILY MEDICINE

## 2020-08-31 PROCEDURE — 36415 COLL VENOUS BLD VENIPUNCTURE: CPT

## 2020-08-31 PROCEDURE — 85025 COMPLETE CBC W/AUTO DIFF WBC: CPT

## 2020-08-31 PROCEDURE — 99217 PR OBSERVATION CARE DISCHARGE: CPT | Mod: ,,, | Performed by: FAMILY MEDICINE

## 2020-08-31 PROCEDURE — G0378 HOSPITAL OBSERVATION PER HR: HCPCS

## 2020-08-31 PROCEDURE — 25000003 PHARM REV CODE 250: Performed by: FAMILY MEDICINE

## 2020-08-31 RX ORDER — PREDNISONE 20 MG/1
40 TABLET ORAL DAILY
Qty: 10 TABLET | Refills: 0 | Status: SHIPPED | OUTPATIENT
Start: 2020-08-31

## 2020-08-31 RX ORDER — AZITHROMYCIN 250 MG/1
TABLET, FILM COATED ORAL
Qty: 3 TABLET | Refills: 0 | Status: SHIPPED | OUTPATIENT
Start: 2020-08-31 | End: 2020-09-05

## 2020-08-31 RX ORDER — IPRATROPIUM BROMIDE AND ALBUTEROL SULFATE 2.5; .5 MG/3ML; MG/3ML
3 SOLUTION RESPIRATORY (INHALATION) EVERY 6 HOURS PRN
Qty: 3 BOX | Refills: 11 | Status: SHIPPED | OUTPATIENT
Start: 2020-08-31 | End: 2021-08-31

## 2020-08-31 RX ADMIN — IPRATROPIUM BROMIDE AND ALBUTEROL SULFATE 3 ML: .5; 3 SOLUTION RESPIRATORY (INHALATION) at 01:08

## 2020-08-31 RX ADMIN — METOPROLOL SUCCINATE 25 MG: 25 TABLET, EXTENDED RELEASE ORAL at 09:08

## 2020-08-31 RX ADMIN — CETIRIZINE HYDROCHLORIDE 10 MG: 10 TABLET, FILM COATED ORAL at 09:08

## 2020-08-31 RX ADMIN — MEMANTINE HYDROCHLORIDE 5 MG: 5 TABLET ORAL at 09:08

## 2020-08-31 RX ADMIN — ALPRAZOLAM 0.25 MG: 0.25 TABLET ORAL at 09:08

## 2020-08-31 RX ADMIN — METHYLPREDNISOLONE SODIUM SUCCINATE 80 MG: 125 INJECTION, POWDER, FOR SOLUTION INTRAMUSCULAR; INTRAVENOUS at 03:08

## 2020-08-31 NOTE — PLAN OF CARE
08/31/20 1551   Final Note   Assessment Type Final Discharge Note   Anticipated Discharge Disposition HospiceHome   What phone number can be called within the next 1-3 days to see how you are doing after discharge? [phone]   Hospital Follow Up  Appt(s) scheduled? No  (Pt will be followed for care by hospice medical director.)   Discharge plans and expectations educations in teach back method with documentation complete? Yes   Post-Acute Status   Post-Acute Authorization Hospice   Hospice Status Set-up Complete   Patient choice form signed by patient/caregiver List from [facility] Post-Acute Care   Discharge Delays None known at this time     Pt discharged home with [facility] to meet him upon arrival for admission. DME and discharge medications being delivered to the home by hospice agency. This plan is complete.

## 2020-08-31 NOTE — DISCHARGE SUMMARY
[facility] Hospital Medicine  Discharge Summary      Patient Name: Benjamin Marnique  MRN: [id number]  Admission Date: 8/28/2020  Hospital Length of Stay: 0 days  Discharge Date and Time:  08/31/2020 11:04 AM  Attending Physician: Chelsie Talbot MD   Discharging Provider: Chelsie Talbot MD  Primary Care Provider: Jacky Cohn MD        HPI:   79 yo male with PMH of Alzheimer disease, A-fib on Xarelto, hyperlipidemia who presents to the ER with shortness of breath, cough. Patient was told to come to the ER for COVID testing but on arrival was found to be hypoxic with O2 sats around 90%. Blood work showed WBC 16,000. Wife reports that he has been sitting up on the side of the bed in the middle of the night trying to catch his breath. Has a worsening cough. Mild leg swelling. Wife and daughter report worsening confusion over the last few days. CXR essentially normal though given hypoxia on O2 sats and ABG with Pa02 62, hospital team called for admission for possible developing pneumonia.   * No surgery found *      Hospital Course:   Patient admitted and started on IV azithromycin for presumed community-acquired pneumonia versus COPD/asthma exacerbation. Chest x-ray with longstanding perihilar bronchial thickening. WBC remained elevated during initial 3 days of admission and then trended down to normal.    Suspected to be asthma versus COPD/interstitial lung disease.  Patient started on IV steroids and improved significantly.  Was able to be discharged home in good condition.  After discussion with the family, patient is a good candidate for hospice care.  He has Alzheimer's dementia, AFib, coronary artery disease and interstitial lung disease requiring intermittent oxygen and nebulizer treatment.  Will send home with 5 day course of prednisone, DuoNeb treatments to use up to 4 times a day as needed and azithromycin to finish out 6 day course.  Of note patient also with  "insomnia.  Has been taking Xanax 0.25 mg t.i.d. but may benefit from a longer-acting benzodiazepine such as Klonopin.    Consults:     Final Active Diagnoses:    Diagnosis Date Noted POA    PRINCIPAL PROBLEM:  Hypoxia [R09.02] 08/28/2020 Yes    Community acquired pneumonia of right lung [J18.9] 08/28/2020 Yes    Late onset Alzheimer's disease without behavioral disturbance [G30.1, F02.80] 08/28/2020 Yes    Atrial fibrillation [I48.91] 08/28/2020 Yes      Problems Resolved During this Admission:      Discharged Condition: good    Disposition: Home or Self Care    Follow Up:  Follow-up Information     Jacky Cohn MD In 2 weeks.    Specialty: Family Medicine  Contact information:  [address]  [facility] [address] [phone]                 Patient Instructions:      NEBULIZER FOR HOME USE     Order Specific Question Answer Comments   Height: 6' 2" (1.88 m)    Weight: 81.7 kg (180 lb 1.9 oz)    Length of need (1-99 months): 99      Diet Adult Regular     Notify your health care provider if you experience any of the following:  temperature >100.4     Notify your health care provider if you experience any of the following:  persistent nausea and vomiting or diarrhea     Notify your health care provider if you experience any of the following:  difficulty breathing or increased cough     Activity as tolerated     Medications:  Reconciled Home Medications:      Medication List      START taking these medications    albuterol-ipratropium 2.5 mg-0.5 mg/3 mL nebulizer solution  Commonly known as: DUO-NEB  Take 3 mLs by nebulization every 6 (six) hours as needed for Wheezing. Rescue     azithromycin 250 MG tablet  Commonly known as: Z-LISSETTE  Take 2 tablets by mouth on day 1; Take 1 tablet by mouth on days 2-5     predniSONE 20 MG tablet  Commonly known as: DELTASONE  Take 2 tablets (40 mg total) by mouth once daily.        CONTINUE taking these medications    ALPRAZolam 0.25 MG tablet  Commonly known as: XANAX  Take " by mouth 3 (three) times daily.     CRESTOR 10 MG tablet  Generic drug: rosuvastatin  Take 10 mg by mouth once daily.     hydrOXYzine 50 MG tablet  Commonly known as: ATARAX  Take 50 mg by mouth 3 (three) times daily as needed for Itching.     loratadine 10 mg tablet  Commonly known as: CLARITIN  Take 10 mg by mouth once daily.     memantine 10 MG Tab  Commonly known as: NAMENDA  Take 5 mg by mouth 2 (two) times daily.     metoprolol succinate 25 MG 24 hr tablet  Commonly known as: TOPROL-XL  Take 25 mg by mouth once daily.     XARELTO 20 mg Tab  Generic drug: rivaroxaban  Take 20 mg by mouth daily with dinner or evening meal.            Significant Diagnostic Studies: Labs:   BMP:   Recent Labs   Lab 08/30/20  0714 08/31/20  0613    184*    136   K 3.9 4.4    99   CO2 27 24   BUN 12 13   CREATININE 0.9 0.8   CALCIUM 8.7 9.6   , CMP   Recent Labs   Lab 08/30/20  0714 08/31/20  0613    136   K 3.9 4.4    99   CO2 27 24    184*   BUN 12 13   CREATININE 0.9 0.8   CALCIUM 8.7 9.6   ANIONGAP 9 13   ESTGFRAFRICA >60.0 >60.0   EGFRNONAA >60.0 >60.0   , CBC   Recent Labs   Lab 08/30/20  0714 08/31/20  0613   WBC 16.51* 11.72   HGB 14.1 14.5   HCT 41.8 42.7    194    and All labs within the past 24 hours have been reviewed    Pending Diagnostic Studies:     None        Indwelling Lines/Drains at time of discharge:   Lines/Drains/Airways     None                 Time spent on the discharge of patient: 35 minutes  Patient was seen and examined on the date of discharge and determined to be suitable for discharge.         Chelsie Talbot MD  Department of Hospital Medicine  [facility]

## 2020-08-31 NOTE — PLAN OF CARE
Problem: Adult Inpatient Plan of Care  Goal: Plan of Care Review  Outcome: Met  Goal: Patient-Specific Goal (Individualization)  Outcome: Met  Goal: Absence of Hospital-Acquired Illness or Injury  Outcome: Met  Goal: Optimal Comfort and Wellbeing  Outcome: Met  Goal: Readiness for Transition of Care  Outcome: Met  Goal: Rounds/Family Conference  Outcome: Met     Problem: Respiratory Compromise (Pneumonia)  Goal: Effective Oxygenation and Ventilation  Outcome: Met     Problem: Skin Injury Risk Increased  Goal: Skin Health and Integrity  Outcome: Met     Problem: Fall Injury Risk  Goal: Absence of Fall and Fall-Related Injury  Outcome: Met

## 2020-08-31 NOTE — PLAN OF CARE
08/31/20 1548   FERNANDEZ Message   Medicare Outpatient and Observation Notification regarding financial responsibility Given to patient/caregiver;Explained to patient/caregiver;Signed/date by patient/caregiver   Date FERNANDEZ was signed 08/31/20   Time FERNANDEZ was signed [id number]

## 2020-08-31 NOTE — NURSING
1800 PT ON T 26 AFIB HR 76. NO SIGNS OF RESP. DISTRESS. NO C/O PAIN. MUKUL CRUZ RN  
Pt's wife given discharge instructions and verbalizes understanding. Wife informed that hospice will be picking up prescriptions for patient.  Pt is awake and alert, responds appropriately.  Pt has no complaints of chest pain or SOB.  Pt shows no obvious signs of distress.  Pt wheeled out to personal vehicle w/o incident.   
Report taken from ROMINA Callaway at 1725.  Patient arrived to floor from ER via stretcher at 1755, accompanied by wife.  Patient ambulated to bathroom with standby assist, and helped back to bed.  VS taken, oxygen going at 2L NC, bed alarm placed.  Patient is disoriented, but pleasant.  NAD noted.   
Implemented All Universal Safety Interventions:  [place] to call system. Call bell, personal items and telephone within reach. Instruct patient to call for assistance. Room bathroom lighting operational. Non-slip footwear when patient is off stretcher. Physically safe environment: no spills, clutter or unnecessary equipment. Stretcher in lowest position, wheels locked, appropriate side rails in place.

## 2020-08-31 NOTE — PLAN OF CARE
08/31/20 1536   Discharge Assessment   Assessment Type Discharge Planning Assessment   Confirmed/corrected address and phone number on facesheet? Yes   Assessment information obtained from? Other  (PTs spouse and daughter provided information for this assessment)   Expected Length of Stay (days) 1   Communicated expected length of stay with patient/caregiver yes   Prior to hospitilization cognitive status: Not Oriented to Place;Not Oriented to Time   Prior to hospitalization functional status: Needs Assistance   Current cognitive status: Not Oriented to Place;Not Oriented to Time   Current Functional Status: Needs Assistance   Facility Arrived From: PCP office   Lives With child(arturo), adult;spouse   Able to Return to Prior Arrangements yes   Is patient able to care for self after discharge? No   Who are your caregiver(s) and their phone number(s)? Patty Manrique spouse [phone]   Patient's perception of discharge disposition hospice/home   Readmission Within the Last 30 Days no previous admission in last 30 days   Patient currently being followed by outpatient case management? No   Patient currently receives any other outside agency services? No   Equipment Currently Used at Home none   Do you have any problems affording any of your prescribed medications? No   Is the patient taking medications as prescribed? yes   Does the patient have transportation home? Yes   Transportation Anticipated family or friend will provide   Dialysis Name and Scheduled days n/a   Does the patient receive services at the [facility]? No   Discharge Plan A Hospice/home   DME Needed Upon Discharge  nebulizer;oxygen;wheelchair   Patient/Family in Agreement with Plan yes     Pt is 80 year old male admitted with cough/pneumonia. Pt with alzheimer's disease and does not verbally answer questions. He states yes, no or shakes head with a soft laugh when spoken to. Spouse and daughter are at bedside and providing information for this  assessment. Prior to SW arrival family has discussed Hospice services and they immediately began asking questions about those services. Pt spouse was anxious, tearful and verbalizing sadness the pt is dying. Daughter repeatedly telling her mother to calm down and that the pt was not actively dying.     SW redirected the pts spouse and she engaged in conversation regarding the pts level of functioning at home prior to admission. Per the spouse, the pt was still ambulatory with direction and someone with him at all times. She has been the sole caregiver as her daughter works and is not home for assistance with his care.    SW then explained benefits and services of hospice to both the daughter and spouse. Spouse appropriate and verbalized understanding of hospice services at home. Both in agreement to discharge home with hospice. After review of agencies available they selected [facility] as their provider. SW faxed referral and medication documentation to the agency, then received acceptance to admit per Alycia London, representative. Pt discharged home to meet hospice personnel for admission. Hospice sending nebulizer, oxygen, wheelchair and discharge meds to the home for care. This plan is complete.

## 2020-09-02 LAB
BACTERIA BLD CULT: NORMAL
BACTERIA BLD CULT: NORMAL

## 2021-06-18 ENCOUNTER — HOSPITAL ENCOUNTER (OUTPATIENT)
Dept: RADIOLOGY | Facility: HOSPITAL | Age: 81
Discharge: HOME | End: 2021-06-18
Attending: FAMILY MEDICINE
Payer: MEDICARE

## 2021-06-18 DIAGNOSIS — R53.81 MALAISE: ICD-10-CM

## 2021-06-18 DIAGNOSIS — R41.0 CONFUSION: ICD-10-CM

## 2021-06-18 DIAGNOSIS — G30.9 ALZHEIMER'S DEMENTIA: ICD-10-CM

## 2021-06-18 DIAGNOSIS — F02.80 ALZHEIMER'S DEMENTIA: ICD-10-CM

## 2021-06-18 DIAGNOSIS — R41.3 MEMORY LOSS: ICD-10-CM

## 2021-06-18 DIAGNOSIS — R53.83 LETHARGY: ICD-10-CM

## 2021-06-18 DIAGNOSIS — D72.829 ELEVATED WBC COUNT: ICD-10-CM

## 2021-06-21 ENCOUNTER — HOSPITAL ENCOUNTER (OUTPATIENT)
Dept: RADIOLOGY | Facility: HOSPITAL | Age: 81
Discharge: HOME | End: 2021-06-21
Attending: FAMILY MEDICINE
Payer: MEDICARE

## 2021-06-21 DIAGNOSIS — R91.8 OTHER NONSPECIFIC ABNORMAL FINDING OF LUNG FIELD: ICD-10-CM

## 2021-06-21 DIAGNOSIS — J90 PLEURAL EFFUSION: ICD-10-CM

## 2021-06-21 PROCEDURE — 25500020 PHARM REV CODE 255: Performed by: FAMILY MEDICINE

## 2021-06-21 RX ADMIN — IOHEXOL 75 ML: 350 INJECTION, SOLUTION INTRAVENOUS at 01:06

## 2021-07-21 ENCOUNTER — LAB VISIT (OUTPATIENT)
Dept: LAB | Facility: HOSPITAL | Age: 81
End: 2021-07-21
Attending: FAMILY MEDICINE
Payer: MEDICARE

## 2021-07-21 DIAGNOSIS — D72.9 ABNORMAL WHITE BLOOD CELL (WBC) COUNT: Primary | ICD-10-CM

## 2021-07-21 LAB
CRP SERPL-MCNC: 0.8 MG/L (ref 0–8.2)
ERYTHROCYTE [DISTWIDTH] IN BLOOD BY AUTOMATED COUNT: 13.4 % (ref 11.5–14.5)
HCT VFR BLD AUTO: 44.2 % (ref 40–54)
HGB BLD-MCNC: 15.3 G/DL (ref 14–18)
MCH RBC QN AUTO: 35.5 PG (ref 27–31)
MCHC RBC AUTO-ENTMCNC: 34.6 G/DL (ref 32–36)
MCV RBC AUTO: 103 FL (ref 82–98)
PLATELET # BLD AUTO: 132 K/UL (ref 150–450)
PMV BLD AUTO: 10.2 FL (ref 9.2–12.9)
RBC # BLD AUTO: 4.31 M/UL (ref 4.6–6.2)
WBC # BLD AUTO: 35.75 K/UL (ref 3.9–12.7)

## 2021-07-21 PROCEDURE — 36415 COLL VENOUS BLD VENIPUNCTURE: CPT | Performed by: FAMILY MEDICINE

## 2021-07-21 PROCEDURE — 86140 C-REACTIVE PROTEIN: CPT | Performed by: FAMILY MEDICINE

## 2021-07-21 PROCEDURE — 85027 COMPLETE CBC AUTOMATED: CPT | Performed by: FAMILY MEDICINE
